# Patient Record
Sex: MALE | Race: WHITE | NOT HISPANIC OR LATINO | URBAN - METROPOLITAN AREA
[De-identification: names, ages, dates, MRNs, and addresses within clinical notes are randomized per-mention and may not be internally consistent; named-entity substitution may affect disease eponyms.]

---

## 2018-07-24 ENCOUNTER — APPOINTMENT (OUTPATIENT)
Dept: URBAN - METROPOLITAN AREA CLINIC 200 | Age: 43
Setting detail: DERMATOLOGY
End: 2018-07-25

## 2018-07-24 DIAGNOSIS — Z12.83 ENCOUNTER FOR SCREENING FOR MALIGNANT NEOPLASM OF SKIN: ICD-10-CM

## 2018-07-24 PROCEDURE — OTHER COUNSELING: OTHER

## 2018-07-24 PROCEDURE — 99213 OFFICE O/P EST LOW 20 MIN: CPT

## 2018-07-24 ASSESSMENT — LOCATION ZONE DERM: LOCATION ZONE: NECK

## 2018-07-24 ASSESSMENT — LOCATION DETAILED DESCRIPTION DERM: LOCATION DETAILED: LEFT INFERIOR ANTERIOR NECK

## 2018-07-24 ASSESSMENT — LOCATION SIMPLE DESCRIPTION DERM: LOCATION SIMPLE: LEFT ANTERIOR NECK

## 2019-06-24 NOTE — TELEPHONE ENCOUNTER
Medicine Refill Request    Last Office Visit: Visit date not found  Next Office Visit: 7/23/2019      No current outpatient prescriptions on file.      BP Readings from Last 3 Encounters:  No data found for BP      Recent Lab results:  No results found for: CHOL, No results found for: HDL, No results found for: LDLCALC, No results found for: TRIG     No results found for: GLUCOSE, No results found for: HGBA1C      No results found for: CREATININE    No results found for: TSH

## 2019-06-25 RX ORDER — SIMVASTATIN 40 MG/1
TABLET, FILM COATED ORAL
Qty: 90 TABLET | Refills: 3 | Status: SHIPPED | OUTPATIENT
Start: 2019-06-25 | End: 2020-08-06

## 2019-07-15 ENCOUNTER — APPOINTMENT (OUTPATIENT)
Dept: URBAN - METROPOLITAN AREA CLINIC 200 | Age: 44
Setting detail: DERMATOLOGY
End: 2019-07-15

## 2019-07-15 DIAGNOSIS — L57.0 ACTINIC KERATOSIS: ICD-10-CM

## 2019-07-15 DIAGNOSIS — Z12.83 ENCOUNTER FOR SCREENING FOR MALIGNANT NEOPLASM OF SKIN: ICD-10-CM

## 2019-07-15 PROCEDURE — 17000 DESTRUCT PREMALG LESION: CPT

## 2019-07-15 PROCEDURE — OTHER LIQUID NITROGEN: OTHER

## 2019-07-15 ASSESSMENT — PAIN INTENSITY VAS: HOW INTENSE IS YOUR PAIN 0 BEING NO PAIN, 10 BEING THE MOST SEVERE PAIN POSSIBLE?: NO PAIN

## 2019-07-15 ASSESSMENT — LOCATION ZONE DERM: LOCATION ZONE: ARM

## 2019-07-15 ASSESSMENT — LOCATION SIMPLE DESCRIPTION DERM: LOCATION SIMPLE: RIGHT FOREARM

## 2019-07-15 ASSESSMENT — LOCATION DETAILED DESCRIPTION DERM: LOCATION DETAILED: RIGHT DISTAL ULNAR DORSAL FOREARM

## 2019-07-15 NOTE — PROCEDURE: LIQUID NITROGEN
Render Post-Care Instructions In Note?: no
Detail Level: Simple
Duration Of Freeze Thaw-Cycle (Seconds): 2
Post-Care Instructions: I reviewed with the patient in detail post-care instructions. Patient is to wear sunprotection, and avoid picking at any of the treated lesions. Pt may apply Vaseline to crusted or scabbing areas.
Consent: The patient's consent was obtained including but not limited to risks of crusting, scabbing, blistering, scarring, darker or lighter pigmentary change, recurrence, incomplete removal and infection.

## 2019-07-23 ENCOUNTER — OFFICE VISIT (OUTPATIENT)
Dept: PRIMARY CARE | Facility: CLINIC | Age: 44
End: 2019-07-23
Payer: COMMERCIAL

## 2019-07-23 VITALS
HEART RATE: 70 BPM | BODY MASS INDEX: 26.34 KG/M2 | SYSTOLIC BLOOD PRESSURE: 120 MMHG | WEIGHT: 205.2 LBS | HEIGHT: 74 IN | DIASTOLIC BLOOD PRESSURE: 80 MMHG

## 2019-07-23 DIAGNOSIS — E78.00 PURE HYPERCHOLESTEROLEMIA: ICD-10-CM

## 2019-07-23 DIAGNOSIS — Z00.00 ROUTINE PHYSICAL EXAMINATION: Primary | ICD-10-CM

## 2019-07-23 PROCEDURE — 99396 PREV VISIT EST AGE 40-64: CPT | Performed by: NURSE PRACTITIONER

## 2019-07-23 ASSESSMENT — ENCOUNTER SYMPTOMS
PSYCHIATRIC NEGATIVE: 1
EYES NEGATIVE: 1
ALLERGIC/IMMUNOLOGIC NEGATIVE: 1
RESPIRATORY NEGATIVE: 1
NEUROLOGICAL NEGATIVE: 1
ENDOCRINE NEGATIVE: 1
GASTROINTESTINAL NEGATIVE: 1
CARDIOVASCULAR NEGATIVE: 1
HEMATOLOGIC/LYMPHATIC NEGATIVE: 1
MUSCULOSKELETAL NEGATIVE: 1
CONSTITUTIONAL NEGATIVE: 1

## 2019-07-23 NOTE — ASSESSMENT & PLAN NOTE
Screening labs ordered at [facility].  Complete after 8/6/19.  UTD with skin survey and routine eye exam.  Encouraged a regular exercise program.

## 2019-07-23 NOTE — PROGRESS NOTES
[facility]  Niecy Nascimento  [address]  [place] [address]  Phone: [phone]  Fax: [phone]      Patient ID: Darren Mercado                              : 1975    Visit Date: 2019    Chief Complaint: Annual Exam         Patient ID: Darren Mercado is a 44 y.o. male.    Patient Active Problem List   Diagnosis   • Pure hypercholesterolemia   • Routine physical examination         Current Outpatient Prescriptions:   •  simvastatin (ZOCOR) 40 mg tablet, TAKE 1 TABLET BY MOUTH EVERY DAY, Disp: 90 tablet, Rfl: 3    No Known Allergies    Social History     Social History   • Marital status:      Spouse name: N/A   • Number of children: N/A   • Years of education: N/A     Occupational History   • Not on file.     Social History Main Topics   • Smoking status: Never Smoker   • Smokeless tobacco: Never Used   • Alcohol use Not on file   • Drug use: Unknown   • Sexual activity: Not on file     Other Topics Concern   • Not on file     Social History Narrative   • No narrative on file       Health Maintenance   Topic Date Due   • Varicella Vaccines (1 of 2 - 13+ 2-dose series) 1989   • DTaP, Tdap, and Td Vaccines (1 - Tdap) 1995   • Influenza Vaccine (1) 2019   • Meningococcal ACWY  Aged Out   • HIB Vaccines  Aged Out   • IPV Vaccines  Aged Out   • HPV Vaccines  Aged Out   • Pneumococcal  Aged Out     Family History   Problem Relation Age of Onset   • Adopted: Yes     Past Surgical History:   Procedure Laterality Date   • EYE SURGERY      lazy eye       HPI  Routine PE.        The following have been reviewed and updated as appropriate in this visit:  Allergies  Meds  Problems         Review of System  Review of Systems   Constitutional: Negative.    HENT: Negative.    Eyes: Negative.    Respiratory: Negative.    Cardiovascular: Negative.    Gastrointestinal: Negative.    Endocrine: Negative.    Genitourinary: Negative.   "  Musculoskeletal: Negative.    Skin: Negative.    Allergic/Immunologic: Negative.    Neurological: Negative.    Hematological: Negative.    Psychiatric/Behavioral: Negative.        Objective     Vitals  Vitals:    07/23/19 0722   BP: 120/80   BP Location: Left upper arm   Patient Position: Sitting   Pulse: 70   Weight: 93.1 kg (205 lb 3.2 oz)   Height: 1.88 m (6' 2\")     Body mass index is 26.35 kg/m².    Physical Exam  Physical Exam   Constitutional: He is oriented to person, place, and time. He appears well-developed and well-nourished. No distress.   HENT:   Head: Normocephalic.   Right Ear: Tympanic membrane, external ear and ear canal normal.   Left Ear: Tympanic membrane, external ear and ear canal normal.   Nose: Nose normal.   Mouth/Throat: Oropharynx is clear and moist and mucous membranes are normal.   Eyes: Pupils are equal, round, and reactive to light. Conjunctivae and EOM are normal. Right eye exhibits no discharge. Left eye exhibits no discharge.   Neck: Neck supple. No JVD present. No thyromegaly present.   Cardiovascular: Normal rate, regular rhythm, normal heart sounds and intact distal pulses.  Exam reveals no gallop and no friction rub.    No murmur heard.  Pulmonary/Chest: Effort normal and breath sounds normal. No respiratory distress. He has no wheezes. He has no rales.   Abdominal: Soft. Bowel sounds are normal. He exhibits no distension and no mass. There is no hepatosplenomegaly. There is no tenderness. There is no rebound, no guarding and no CVA tenderness.   Musculoskeletal: Normal range of motion. He exhibits no edema, tenderness or deformity.   Lymphadenopathy:     He has no cervical adenopathy.   Neurological: He is alert and oriented to person, place, and time. He displays normal reflexes. No cranial nerve deficit or sensory deficit.   Skin: Skin is warm and dry. No rash noted. He is not diaphoretic. No erythema. No pallor.   Vitals reviewed.      Assessment/Plan     Problem List " Items Addressed This Visit     Pure hypercholesterolemia     On statin. Tolerating well.  Labs ordered for August.         Routine physical examination - Primary     Screening labs ordered at [facility].  Complete after 8/6/19.  UTD with skin survey and routine eye exam.  Encouraged a regular exercise program.         Relevant Orders    CBC and Differential    Comprehensive metabolic panel    Lipid panel    Urinalysis with Reflex Culture              JEANNETTE Ayala  7/23/2019

## 2019-09-22 LAB
ALBUMIN SERPL-MCNC: 4.7 G/DL (ref 3.6–5.1)
ALBUMIN/GLOB SERPL: 2.2 (CALC) (ref 1–2.5)
ALP SERPL-CCNC: 43 U/L (ref 40–115)
ALT SERPL-CCNC: 28 U/L (ref 9–46)
APPEARANCE UR: CLEAR
AST SERPL-CCNC: 19 U/L (ref 10–40)
BACTERIA #/AREA URNS HPF: NORMAL /HPF
BACTERIA UR CULT: NORMAL
BASOPHILS # BLD AUTO: 41 CELLS/UL (ref 0–200)
BASOPHILS NFR BLD AUTO: 0.8 %
BILIRUB SERPL-MCNC: 0.6 MG/DL (ref 0.2–1.2)
BILIRUB UR QL STRIP: NEGATIVE
BUN SERPL-MCNC: 15 MG/DL (ref 7–25)
BUN/CREAT SERPL: NORMAL (CALC) (ref 6–22)
CALCIUM SERPL-MCNC: 10 MG/DL (ref 8.6–10.3)
CHLORIDE SERPL-SCNC: 103 MMOL/L (ref 98–110)
CHOLEST SERPL-MCNC: 227 MG/DL
CHOLEST/HDLC SERPL: 4.6 (CALC)
CO2 SERPL-SCNC: 30 MMOL/L (ref 20–32)
COLOR UR: YELLOW
CREAT SERPL-MCNC: 0.93 MG/DL (ref 0.6–1.35)
EOSINOPHIL # BLD AUTO: 260 CELLS/UL (ref 15–500)
EOSINOPHIL NFR BLD AUTO: 5.1 %
ERYTHROCYTE [DISTWIDTH] IN BLOOD BY AUTOMATED COUNT: 12.8 % (ref 11–15)
GLOBULIN SER CALC-MCNC: 2.1 G/DL (CALC) (ref 1.9–3.7)
GLUCOSE SERPL-MCNC: 92 MG/DL (ref 65–99)
GLUCOSE UR QL STRIP: NEGATIVE
HCT VFR BLD AUTO: 46.8 % (ref 38.5–50)
HDLC SERPL-MCNC: 49 MG/DL
HGB BLD-MCNC: 15.5 G/DL (ref 13.2–17.1)
HGB UR QL STRIP: NEGATIVE
HYALINE CASTS #/AREA URNS LPF: NORMAL /LPF
KETONES UR QL STRIP: NEGATIVE
LDLC SERPL CALC-MCNC: 152 MG/DL (CALC)
LEUKOCYTE ESTERASE UR QL STRIP: NEGATIVE
LYMPHOCYTES # BLD AUTO: 1443 CELLS/UL (ref 850–3900)
LYMPHOCYTES NFR BLD AUTO: 28.3 %
MCH RBC QN AUTO: 28.5 PG (ref 27–33)
MCHC RBC AUTO-ENTMCNC: 33.1 G/DL (ref 32–36)
MCV RBC AUTO: 86 FL (ref 80–100)
MONOCYTES # BLD AUTO: 439 CELLS/UL (ref 200–950)
MONOCYTES NFR BLD AUTO: 8.6 %
NEUTROPHILS # BLD AUTO: 2917 CELLS/UL (ref 1500–7800)
NEUTROPHILS NFR BLD AUTO: 57.2 %
NITRITE UR QL STRIP: NEGATIVE
NONHDLC SERPL-MCNC: 178 MG/DL (CALC)
PH UR STRIP: 6 [PH] (ref 5–8)
PLATELET # BLD AUTO: 262 THOUSAND/UL (ref 140–400)
PMV BLD REES-ECKER: 9.6 FL (ref 7.5–12.5)
POTASSIUM SERPL-SCNC: 4.5 MMOL/L (ref 3.5–5.3)
PROT SERPL-MCNC: 6.8 G/DL (ref 6.1–8.1)
PROT UR QL STRIP: NEGATIVE
QUEST EGFR NON-AFR. AMERICAN: 100 ML/MIN/1.73M2
RBC # BLD AUTO: 5.44 MILLION/UL (ref 4.2–5.8)
RBC #/AREA URNS HPF: NORMAL /HPF
SODIUM SERPL-SCNC: 140 MMOL/L (ref 135–146)
SP GR UR STRIP: 1.02 (ref 1–1.03)
SQUAMOUS #/AREA URNS HPF: NORMAL /HPF
TRIGL SERPL-MCNC: 134 MG/DL
WBC # BLD AUTO: 5.1 THOUSAND/UL (ref 3.8–10.8)
WBC #/AREA URNS HPF: NORMAL /HPF

## 2020-07-20 ENCOUNTER — APPOINTMENT (OUTPATIENT)
Dept: URBAN - METROPOLITAN AREA CLINIC 200 | Age: 45
Setting detail: DERMATOLOGY
End: 2020-07-20

## 2020-07-20 DIAGNOSIS — L82.1 OTHER SEBORRHEIC KERATOSIS: ICD-10-CM

## 2020-07-20 DIAGNOSIS — L57.8 OTHER SKIN CHANGES DUE TO CHRONIC EXPOSURE TO NONIONIZING RADIATION: ICD-10-CM

## 2020-07-20 DIAGNOSIS — Z12.83 ENCOUNTER FOR SCREENING FOR MALIGNANT NEOPLASM OF SKIN: ICD-10-CM

## 2020-07-20 PROCEDURE — OTHER REASSURANCE: OTHER

## 2020-07-20 PROCEDURE — 99213 OFFICE O/P EST LOW 20 MIN: CPT

## 2020-07-20 PROCEDURE — OTHER COUNSELING: OTHER

## 2020-07-20 ASSESSMENT — LOCATION ZONE DERM
LOCATION ZONE: TRUNK
LOCATION ZONE: LEG

## 2020-07-20 ASSESSMENT — LOCATION DETAILED DESCRIPTION DERM
LOCATION DETAILED: LEFT MEDIAL SUPERIOR CHEST
LOCATION DETAILED: RIGHT POPLITEAL SKIN
LOCATION DETAILED: RIGHT SUPERIOR MEDIAL UPPER BACK
LOCATION DETAILED: LEFT INFERIOR LATERAL MIDBACK

## 2020-07-20 ASSESSMENT — LOCATION SIMPLE DESCRIPTION DERM
LOCATION SIMPLE: CHEST
LOCATION SIMPLE: RIGHT UPPER BACK
LOCATION SIMPLE: RIGHT POPLITEAL SKIN
LOCATION SIMPLE: LEFT LOWER BACK

## 2020-08-03 ENCOUNTER — OFFICE VISIT (OUTPATIENT)
Dept: PRIMARY CARE | Facility: CLINIC | Age: 45
End: 2020-08-03
Payer: COMMERCIAL

## 2020-08-03 VITALS
HEART RATE: 78 BPM | HEIGHT: 74 IN | OXYGEN SATURATION: 98 % | BODY MASS INDEX: 23.87 KG/M2 | SYSTOLIC BLOOD PRESSURE: 130 MMHG | DIASTOLIC BLOOD PRESSURE: 80 MMHG | WEIGHT: 186 LBS | TEMPERATURE: 98.6 F

## 2020-08-03 DIAGNOSIS — E78.00 PURE HYPERCHOLESTEROLEMIA: ICD-10-CM

## 2020-08-03 DIAGNOSIS — Z00.00 ENCOUNTER FOR ANNUAL PHYSICAL EXAM: ICD-10-CM

## 2020-08-03 DIAGNOSIS — Z00.00 PREVENTATIVE HEALTH CARE: Primary | ICD-10-CM

## 2020-08-03 PROCEDURE — 99396 PREV VISIT EST AGE 40-64: CPT | Performed by: INTERNAL MEDICINE

## 2020-08-03 SDOH — HEALTH STABILITY: MENTAL HEALTH: HOW OFTEN DO YOU HAVE A DRINK CONTAINING ALCOHOL?: 2-3 TIMES A WEEK

## 2020-08-03 ASSESSMENT — ENCOUNTER SYMPTOMS
ENDOCRINE NEGATIVE: 1
MUSCULOSKELETAL NEGATIVE: 1
CARDIOVASCULAR NEGATIVE: 1
NEUROLOGICAL NEGATIVE: 1
DYSURIA: 0
FLANK PAIN: 0
ALLERGIC/IMMUNOLOGIC NEGATIVE: 1
PSYCHIATRIC NEGATIVE: 1
GASTROINTESTINAL NEGATIVE: 1
HEMATURIA: 0
HEMATOLOGIC/LYMPHATIC NEGATIVE: 1
RESPIRATORY NEGATIVE: 1
DIFFICULTY URINATING: 0

## 2020-08-03 NOTE — PROGRESS NOTES
[facility]  Dr. Modesta Ashby  [address]  [place] [address]  Phone: [phone]  Fax: [phone]      Patient ID: Darren Mercado                              : 1975    Visit Date: 2020    Chief Complaint: Annual Exam      HPI  Here for annual physical  Reports his wife makes certain that he does this      No current outpatient medications on file.     No current facility-administered medications for this visit.        No Known Allergies    Past Surgical History:   Procedure Laterality Date   • EYE SURGERY Right     lazy eye       History reviewed. No pertinent past medical history.    Health Maintenance   Topic Date Due   • Varicella Vaccines (1 of 2 - 2-dose childhood series) 1977   • HIV Screening  1989   • Influenza Vaccine (1) 2020   • DTaP, Tdap, and Td Vaccines (2 - Td) 2028   • Meningococcal ACWY  Aged Out   • HIB Vaccines  Aged Out   • IPV Vaccines  Aged Out   • HPV Vaccines  Aged Out   • Pneumococcal  Aged Out       Social History     Tobacco Use   • Smoking status: Never Smoker   • Smokeless tobacco: Never Used   Substance Use Topics   • Alcohol use: Yes     Alcohol/week: 5.0 standard drinks     Types: 5 Standard drinks or equivalent per week     Frequency: 2-3 times a week   • Drug use: Never       Family History   Adopted: Yes   Family history unknown: Yes       The following have been reviewed and updated as appropriate in this visit:  Allergies  Meds  Problems         Review of Systems  Review of Systems   Constitutional: Positive for unexpected weight change (related to the pandemic weight loss - stabilized a month ago).   HENT: Negative.         Dental exam 2 weeks ago and done twice a year   Eyes: Positive for visual disturbance (decreased vision right due to lazy eye). Negative for photophobia, pain, discharge, redness and itching.        Eye doc visits annually - last 2019 and has one  "scheduled next month   Respiratory: Negative.    Cardiovascular: Negative.    Gastrointestinal: Negative.    Endocrine: Negative.    Genitourinary: Negative for difficulty urinating, discharge, dysuria, flank pain, genital sores, hematuria, penile pain, scrotal swelling and testicular pain.        Advised to do testicular exams every 2 - 3 months and report any lumps/changes noted   Musculoskeletal: Negative.         Dermatologist evaluation 2 weeks ago   Skin: Negative.         Dermatologist visit 2 weeks ago - annual visit   Allergic/Immunologic: Negative.    Neurological: Negative.    Hematological: Negative.    Psychiatric/Behavioral: Negative.         Vitals:    08/03/20 0743   BP: 130/80   BP Location: Left upper arm   Patient Position: Sitting   Pulse: 78   Temp: 37 °C (98.6 °F)   SpO2: 98%   Weight: 84.4 kg (186 lb)   Height: 1.88 m (6' 2\")       Body mass index is 23.88 kg/m².    Physical Exam  Physical Exam   Constitutional: He is oriented to person, place, and time. He appears well-developed and well-nourished.   HENT:   Head: Normocephalic.   Right Ear: Hearing, tympanic membrane, external ear and ear canal normal.   Left Ear: Hearing, tympanic membrane, external ear and ear canal normal.   Nose: Nose normal.   Not examined as patient without related complaints and had a normal dental exam 2 weeks ago   Eyes: Pupils are equal, round, and reactive to light. Conjunctivae, EOM and lids are normal.   Neck: Trachea normal, normal range of motion and phonation normal. Neck supple. Carotid bruit is not present. No thyroid mass and no thyromegaly present.   Cardiovascular: Normal rate, regular rhythm, S1 normal and S2 normal.  No extrasystoles are present. Exam reveals no gallop.   No murmur heard.  Pulmonary/Chest: Effort normal and breath sounds normal.   Abdominal: Soft. Normal appearance and bowel sounds are normal. He exhibits no mass. There is no hepatosplenomegaly. There is no tenderness. There is no CVA " tenderness.   Musculoskeletal: Normal range of motion. He exhibits no edema, tenderness or deformity.   Lymphadenopathy:        Head (right side): No submandibular adenopathy present.        Head (left side): No submandibular adenopathy present.     He has no cervical adenopathy.        Right: No supraclavicular adenopathy present.        Left: No supraclavicular adenopathy present.   Neurological: He is alert and oriented to person, place, and time. He has normal strength. No cranial nerve deficit. Gait normal.   Skin: Skin is warm, dry and intact. No rash noted. No pallor.   Psychiatric: He has a normal mood and affect. His speech is normal and behavior is normal. Judgment and thought content normal. Cognition and memory are normal.   Vitals reviewed.      Assessment/Plan     Problem List Items Addressed This Visit        Endocrine/Metabolic    Pure hypercholesterolemia    Current Assessment & Plan     calculated ASCVD risk < 7.5  pt stopped statin on his own  recheck labs and consider calcium scoring  adhere to low saturated fat and white carb diet            Other    Preventative health care - Primary    Relevant Orders    CBC and Differential    Comprehensive metabolic panel    Lipid panel    Urinalysis with Reflex Culture (ED and Outpatient only)        Flu shot in the fall  Return in about 1 year (around 8/3/2021).          Modesta Ashby MD  8/6/2020

## 2020-08-06 ASSESSMENT — ENCOUNTER SYMPTOMS
UNEXPECTED WEIGHT CHANGE: 1
EYE PAIN: 0
PHOTOPHOBIA: 0
EYE ITCHING: 0
EYE REDNESS: 0
EYE DISCHARGE: 0

## 2020-08-06 NOTE — ASSESSMENT & PLAN NOTE
calculated ASCVD risk < 7.5  pt stopped statin on his own  recheck labs and consider calcium scoring  adhere to low saturated fat and white carb diet

## 2020-08-25 LAB
ALBUMIN SERPL-MCNC: 4.5 G/DL (ref 3.6–5.1)
ALBUMIN/GLOB SERPL: 2.3 (CALC) (ref 1–2.5)
ALP SERPL-CCNC: 42 U/L (ref 36–130)
ALT SERPL-CCNC: 14 U/L (ref 9–46)
APPEARANCE UR: CLEAR
AST SERPL-CCNC: 15 U/L (ref 10–40)
BACTERIA #/AREA URNS HPF: NORMAL /HPF
BACTERIA UR CULT: NORMAL
BASOPHILS # BLD AUTO: 22 CELLS/UL (ref 0–200)
BASOPHILS NFR BLD AUTO: 0.5 %
BILIRUB SERPL-MCNC: 0.6 MG/DL (ref 0.2–1.2)
BILIRUB UR QL STRIP: NEGATIVE
BUN SERPL-MCNC: 14 MG/DL (ref 7–25)
BUN/CREAT SERPL: ABNORMAL (CALC) (ref 6–22)
CALCIUM SERPL-MCNC: 9.6 MG/DL (ref 8.6–10.3)
CHLORIDE SERPL-SCNC: 102 MMOL/L (ref 98–110)
CHOLEST SERPL-MCNC: 273 MG/DL
CHOLEST/HDLC SERPL: 5.8 (CALC)
CO2 SERPL-SCNC: 29 MMOL/L (ref 20–32)
COLOR UR: YELLOW
CREAT SERPL-MCNC: 0.86 MG/DL (ref 0.6–1.35)
EOSINOPHIL # BLD AUTO: 158 CELLS/UL (ref 15–500)
EOSINOPHIL NFR BLD AUTO: 3.6 %
ERYTHROCYTE [DISTWIDTH] IN BLOOD BY AUTOMATED COUNT: 12.5 % (ref 11–15)
GLOBULIN SER CALC-MCNC: 2 G/DL (CALC) (ref 1.9–3.7)
GLUCOSE SERPL-MCNC: 103 MG/DL (ref 65–99)
GLUCOSE UR QL STRIP: NEGATIVE
HCT VFR BLD AUTO: 43.5 % (ref 38.5–50)
HDLC SERPL-MCNC: 47 MG/DL
HGB BLD-MCNC: 14.9 G/DL (ref 13.2–17.1)
HGB UR QL STRIP: NEGATIVE
HYALINE CASTS #/AREA URNS LPF: NORMAL /LPF
KETONES UR QL STRIP: NEGATIVE
LDLC SERPL CALC-MCNC: 205 MG/DL (CALC)
LEUKOCYTE ESTERASE UR QL STRIP: NEGATIVE
LYMPHOCYTES # BLD AUTO: 1632 CELLS/UL (ref 850–3900)
LYMPHOCYTES NFR BLD AUTO: 37.1 %
MCH RBC QN AUTO: 29.3 PG (ref 27–33)
MCHC RBC AUTO-ENTMCNC: 34.3 G/DL (ref 32–36)
MCV RBC AUTO: 85.5 FL (ref 80–100)
MONOCYTES # BLD AUTO: 378 CELLS/UL (ref 200–950)
MONOCYTES NFR BLD AUTO: 8.6 %
NEUTROPHILS # BLD AUTO: 2209 CELLS/UL (ref 1500–7800)
NEUTROPHILS NFR BLD AUTO: 50.2 %
NITRITE UR QL STRIP: NEGATIVE
NONHDLC SERPL-MCNC: 226 MG/DL (CALC)
PH UR STRIP: 6 [PH] (ref 5–8)
PLATELET # BLD AUTO: 275 THOUSAND/UL (ref 140–400)
PMV BLD REES-ECKER: 10.3 FL (ref 7.5–12.5)
POTASSIUM SERPL-SCNC: 4.1 MMOL/L (ref 3.5–5.3)
PROT SERPL-MCNC: 6.5 G/DL (ref 6.1–8.1)
PROT UR QL STRIP: NEGATIVE
QUEST EGFR NON-AFR. AMERICAN: 105 ML/MIN/1.73M2
RBC # BLD AUTO: 5.09 MILLION/UL (ref 4.2–5.8)
RBC #/AREA URNS HPF: NORMAL /HPF
SODIUM SERPL-SCNC: 139 MMOL/L (ref 135–146)
SP GR UR STRIP: 1.02 (ref 1–1.03)
SQUAMOUS #/AREA URNS HPF: NORMAL /HPF
TRIGL SERPL-MCNC: 92 MG/DL
WBC # BLD AUTO: 4.4 THOUSAND/UL (ref 3.8–10.8)
WBC #/AREA URNS HPF: NORMAL /HPF

## 2020-09-03 ENCOUNTER — TELEMEDICINE (OUTPATIENT)
Dept: PRIMARY CARE | Facility: CLINIC | Age: 45
End: 2020-09-03
Payer: COMMERCIAL

## 2020-09-03 DIAGNOSIS — E78.00 PURE HYPERCHOLESTEROLEMIA: Primary | ICD-10-CM

## 2020-09-03 DIAGNOSIS — R73.01 IMPAIRED FASTING GLUCOSE: ICD-10-CM

## 2020-09-03 PROCEDURE — 99442 PR PHYS/QHP TELEPHONE EVALUATION 11-20 MIN: CPT | Performed by: INTERNAL MEDICINE

## 2020-09-03 NOTE — PROGRESS NOTES
Verification of Patient Location:  The patient affirms they are currently located in the following state: [place]    Request for Consent:    Audio Only Encounter   You and I are about to have a telemedicine check-in or visit. This is allowed because you have requested it. This telemedicine visit will be billed to your health insurance or you, if you are self-insured. You understand you will be responsible for any copayments or coinsurances that apply to your telemedicine visit. Before starting our telemedicine visit, I am required to get your consent for this virtual check-in or visit by telemedicine. Do you consent?    Patient Response to Request for Consent:  Yes      Visit Documentation:  Patient Active Problem List   Diagnosis   • Pure hypercholesterolemia   • Encounter for annual physical exam   • Impaired fasting glucose     No current outpatient medications on file.    No Known Allergies    Subjective     Patient ID: Darren Mercado is a 45 y.o. male.  1975      Telemedicine visit scheduled to review labs done last week  Patient advised CBC normal, CMP normal except for FBS of 103 and LDL cholesterol 205 - significantly higher than last year's    He states that he did eat a generous dessert the night before the labs were done  Advised that that would affect the FBS but the cholesterol elevation such that it is would not elevate after just one meal  He has not changed his diet markedly that is is aware of      The following have been reviewed and updated as appropriate in this visit:  Allergies  Meds  Problems       Review of Systems      Assessment/Plan   Diagnoses and all orders for this visit:    Pure hypercholesterolemia (Primary)  Assessment & Plan:  Advised patient that his current cholesterol is very high and that anyone with an LDL cholesterol persisting above 190 should be on a statin  Recommend that he adhere to a low saturated fat diet  Offered to supply him with information - he  declined  Online option is to check out low fat diets in the [facility] web sites  Repeat lipid panel in 4 - 6 months       Orders:  -     Comprehensive metabolic panel; Future  -     Lipid panel; Future    Impaired fasting glucose  Assessment & Plan:  Advised to limit sweets and carbs such as breads, pasta, white rice and white potatoes  Keep weight in ideal area  Exercise regularly      Return in about 6 months (around 3/3/2021).    Time Spent in Medical Discussion During This Encounter:    11 minutes

## 2020-09-05 PROBLEM — R73.01 IMPAIRED FASTING GLUCOSE: Status: ACTIVE | Noted: 2020-09-05

## 2020-09-06 NOTE — ASSESSMENT & PLAN NOTE
Advised to limit sweets and carbs such as breads, pasta, white rice and white potatoes  Keep weight in ideal area  Exercise regularly

## 2020-09-06 NOTE — ASSESSMENT & PLAN NOTE
Advised patient that his current cholesterol is very high and that anyone with an LDL cholesterol persisting above 190 should be on a statin  Recommend that he adhere to a low saturated fat diet  Offered to supply him with information - he declined  Online option is to check out low fat diets in the [facility] web sites  Repeat lipid panel in 4 - 6 months

## 2021-02-23 LAB
ALBUMIN SERPL-MCNC: 4.5 G/DL (ref 3.6–5.1)
ALBUMIN/GLOB SERPL: 2.3 (CALC) (ref 1–2.5)
ALP SERPL-CCNC: 43 U/L (ref 36–130)
ALT SERPL-CCNC: 16 U/L (ref 9–46)
AST SERPL-CCNC: 19 U/L (ref 10–40)
BILIRUB SERPL-MCNC: 0.8 MG/DL (ref 0.2–1.2)
BUN SERPL-MCNC: 15 MG/DL (ref 7–25)
BUN/CREAT SERPL: NORMAL (CALC) (ref 6–22)
CALCIUM SERPL-MCNC: 9.3 MG/DL (ref 8.6–10.3)
CHLORIDE SERPL-SCNC: 102 MMOL/L (ref 98–110)
CHOLEST SERPL-MCNC: 232 MG/DL
CHOLEST/HDLC SERPL: 5.3 (CALC)
CO2 SERPL-SCNC: 29 MMOL/L (ref 20–32)
CREAT SERPL-MCNC: 0.78 MG/DL (ref 0.6–1.35)
GLOBULIN SER CALC-MCNC: 2 G/DL (CALC) (ref 1.9–3.7)
GLUCOSE SERPL-MCNC: 97 MG/DL (ref 65–99)
HDLC SERPL-MCNC: 44 MG/DL
LDLC SERPL CALC-MCNC: 168 MG/DL (CALC)
NONHDLC SERPL-MCNC: 188 MG/DL (CALC)
POTASSIUM SERPL-SCNC: 4 MMOL/L (ref 3.5–5.3)
PROT SERPL-MCNC: 6.5 G/DL (ref 6.1–8.1)
QUEST EGFR NON-AFR. AMERICAN: 110 ML/MIN/1.73M2
SODIUM SERPL-SCNC: 140 MMOL/L (ref 135–146)
TRIGL SERPL-MCNC: 92 MG/DL

## 2021-03-01 ENCOUNTER — OFFICE VISIT (OUTPATIENT)
Dept: PRIMARY CARE | Facility: CLINIC | Age: 46
End: 2021-03-01
Payer: COMMERCIAL

## 2021-03-01 VITALS
HEIGHT: 74 IN | WEIGHT: 178 LBS | TEMPERATURE: 97.3 F | BODY MASS INDEX: 22.84 KG/M2 | SYSTOLIC BLOOD PRESSURE: 122 MMHG | DIASTOLIC BLOOD PRESSURE: 86 MMHG | HEART RATE: 71 BPM

## 2021-03-01 DIAGNOSIS — R73.01 IMPAIRED FASTING GLUCOSE: ICD-10-CM

## 2021-03-01 DIAGNOSIS — E78.00 PURE HYPERCHOLESTEROLEMIA: Primary | ICD-10-CM

## 2021-03-01 PROCEDURE — 99213 OFFICE O/P EST LOW 20 MIN: CPT | Performed by: INTERNAL MEDICINE

## 2021-03-01 NOTE — PROGRESS NOTES
[facility]  Dr. Modesta Ashby  [address]  [place] [address]  Phone: [phone]  Fax: [phone]      Patient ID: Darren Mercado                              : 1975    Visit Date: 3/2/2021    Chief Complaint: Results      Patient ID: Darren Mercado is a 45 y.o. male.    HPI  Here to follow up regarding his lab work    Walking multiple times a day - got a dog which generally moves fairly fast on walks  Not going to restaurants as much - so diet better controlled  Didn't take all fats out of his diet  Lifetime risk of ASCVD calculated today 46%  Current 10 year risk 2.5%  Patient is adopted and does not know his family history   Wife already has him scheduled here for an annual physical in 2021  Reviewed labs done  Hyperlipidemia  This is a new problem. The current episode started more than 1 month ago. The problem is uncontrolled (but okay for current risk). Recent lipid tests were reviewed and are high. He has no history of chronic renal disease, diabetes, hypothyroidism, liver disease, obesity or nephrotic syndrome. Factors aggravating his hyperlipidemia include fatty foods. Pertinent negatives include no chest pain or shortness of breath. Current antihyperlipidemic treatment includes diet change and exercise. The current treatment provides moderate improvement of lipids. There are no compliance problems.  Risk factors for coronary artery disease include male sex and dyslipidemia.         Patient Active Problem List   Diagnosis   • Pure hypercholesterolemia   • Impaired fasting glucose       No current outpatient medications on file.    No Known Allergies    Social History     Tobacco Use   • Smoking status: Never Smoker   • Smokeless tobacco: Never Used   Substance Use Topics   • Alcohol use: Yes     Alcohol/week: 5.0 standard drinks     Types: 5 Standard drinks or equivalent per week     Frequency: 2-3 times a week   • Drug use: Never  "      Health Maintenance   Topic Date Due   • HIV Screening  Never done   • Hepatitis C Screening  Never done   • DTaP, Tdap, and Td Vaccines (2 - Td) 08/26/2028   • Influenza Vaccine  Completed   • Meningococcal ACWY  Aged Out   • HIB Vaccines  Aged Out   • IPV Vaccines  Aged Out   • HPV Vaccines  Aged Out   • Pneumococcal  Aged Out   • Varicella Vaccines  Discontinued       No past medical history on file.    The following have been reviewed and updated as appropriate in this visit:  Allergies  Meds  Problems         Review of System  Review of Systems   Respiratory: Negative for shortness of breath.    Cardiovascular: Negative for chest pain.       Objective     Vitals  Vitals:    03/01/21 0853   BP: 122/86   Pulse: 71   Temp: 36.3 °C (97.3 °F)   Weight: 80.7 kg (178 lb)   Height: 1.88 m (6' 2\")       Body mass index is 22.85 kg/m².    Physical Exam  Physical Exam  Constitutional:       Appearance: Normal appearance.   Neck:      Musculoskeletal: Neck supple.   Cardiovascular:      Rate and Rhythm: Normal rate and regular rhythm.   Pulmonary:      Effort: Pulmonary effort is normal.   Skin:     General: Skin is warm and dry.      Coloration: Skin is not pale.   Neurological:      Mental Status: He is alert.   Psychiatric:         Mood and Affect: Mood normal.         Assessment/Plan     Problem List Items Addressed This Visit        Endocrine/Metabolic    Pure hypercholesterolemia - Primary     Patient without atheroslcerosis related symptoms  Repeat of lipid panel shows considerable improvement with better diet choices and exercise  Encouraged additional diet restrictions and continued exercise  To follow up for annual exam in 5 months  Will order lab work then  Due to patient no knowing his family history, strongly consider calcium scoring in the near future - will consider when pandemic better controlled           Impaired fasting glucose     Decreased weight by #8  Blood sugar improved  Continue to " maintain weight in normal range  Adhere to diet and exercise programs  Follow FBS               Return in about 5 months (around 8/1/2021).      Modesta Ashby MD  3/2/2021

## 2021-03-02 ASSESSMENT — ENCOUNTER SYMPTOMS: SHORTNESS OF BREATH: 0

## 2021-03-02 NOTE — ASSESSMENT & PLAN NOTE
Decreased weight by #8  Blood sugar improved  Continue to maintain weight in normal range  Adhere to diet and exercise programs  Follow FBS

## 2021-03-02 NOTE — ASSESSMENT & PLAN NOTE
Patient without atheroslcerosis related symptoms  Repeat of lipid panel shows considerable improvement with better diet choices and exercise  Encouraged additional diet restrictions and continued exercise  To follow up for annual exam in 5 months  Will order lab work then  Due to patient no knowing his family history, strongly consider calcium scoring in the near future - will consider when pandemic better controlled

## 2021-05-28 ENCOUNTER — TELEMEDICINE (OUTPATIENT)
Dept: PRIMARY CARE | Facility: CLINIC | Age: 46
End: 2021-05-28
Payer: COMMERCIAL

## 2021-05-28 DIAGNOSIS — J30.2 SEASONAL ALLERGIES: Primary | ICD-10-CM

## 2021-05-28 PROCEDURE — 99213 OFFICE O/P EST LOW 20 MIN: CPT | Mod: 95 | Performed by: STUDENT IN AN ORGANIZED HEALTH CARE EDUCATION/TRAINING PROGRAM

## 2021-05-28 RX ORDER — IPRATROPIUM BROMIDE 42 UG/1
2 SPRAY, METERED NASAL 4 TIMES DAILY
Qty: 15 ML | Refills: 1 | Status: SHIPPED | OUTPATIENT
Start: 2021-05-28 | End: 2021-06-21

## 2021-05-28 RX ORDER — ALBUTEROL SULFATE 90 UG/1
2 INHALANT RESPIRATORY (INHALATION) 4 TIMES DAILY PRN
Qty: 18 G | Refills: 1 | Status: SHIPPED | OUTPATIENT
Start: 2021-05-28 | End: 2022-08-10

## 2021-05-28 ASSESSMENT — ENCOUNTER SYMPTOMS
APPETITE CHANGE: 0
SHORTNESS OF BREATH: 0
SINUS PRESSURE: 1
DIAPHORESIS: 0
DIZZINESS: 0
NAUSEA: 0
FATIGUE: 0
COUGH: 1
LIGHT-HEADEDNESS: 0
SORE THROAT: 0
JOINT SWELLING: 0
ABDOMINAL PAIN: 0
FEVER: 0
DIARRHEA: 0
VOMITING: 0
CHILLS: 0
SINUS PAIN: 1
WHEEZING: 0
CONSTIPATION: 0
EYE PAIN: 0
CHEST TIGHTNESS: 0
STRIDOR: 0

## 2021-05-28 NOTE — PROGRESS NOTES
Verification of Patient Location:  The patient affirms they are currently located in the following state: [place]    Request for Consent:    Audio and Video Encounter   Tio, my name is Freda Mcwilliams MD.  Before we proceed, can you please verify your identification by telling me your full name and date of birth?  Can you tell me who is in the room with you?    You and I are about to have a telemedicine check-in or visit because you have requested it.  This is a live video-conference.  I am a real person, speaking to you in real time.  There is no one else with me on the video-conference.  However, when we use ([employer], [employer], etc) it is important for you to know that the video-conference may not be secure or private.  I am not recording this conversation and I am asking you not to record it.  This telemedicine visit will be billed to your health insurance or you, if you are self-insured.  You understand you will be responsible for any copayments or coinsurances that apply to your telemedicine visit.  Communication platform used for this encounter:  [employer]     Before starting our telemedicine visit, I am required to get your consent for this virtual check-in or visit by telemedicine. Do you consent?      Patient Response to Request for Consent:  Yes      Visit Documentation:  Subjective     Patient ID: Darren Mercado is a 46 y.o. male.  1975      HPI     Allergy symptoms  Past 1.5 weeks  Vaccinated for covid with pfizer  Using flonase, claritin for four days  Family has similar symptoms (they are fully vaccinated as well)  Most frustrated symptoms is congestion and ear pressure  Coughing at night is bad  Has seasonal allergies  ROS negative as below    The following have been reviewed and updated as appropriate in this visit:  Allergies  Meds  Problems       Review of Systems   Constitutional: Negative for appetite change, chills, diaphoresis, fatigue and fever.   HENT: Positive for congestion,  ear pain, sinus pressure, sinus pain and sneezing. Negative for sore throat.    Eyes: Negative for pain and visual disturbance.   Respiratory: Positive for cough. Negative for chest tightness, shortness of breath, wheezing and stridor.    Cardiovascular: Negative for chest pain and leg swelling.   Gastrointestinal: Negative for abdominal pain, constipation, diarrhea, nausea and vomiting.   Musculoskeletal: Negative for joint swelling.   Skin: Negative for rash.   Neurological: Negative for dizziness and light-headedness.        Patient Active Problem List   Diagnosis   • Pure hypercholesterolemia   • Impaired fasting glucose   • Seasonal allergies         Current Outpatient Medications:   •  albuterol HFA (VENTOLIN HFA) 90 mcg/actuation inhaler, Inhale 2 puffs 4 (four) times a day as needed for wheezing or shortness of breath., Disp: 18 g, Rfl: 1  •  ipratropium (ATROVENT) 42 mcg (0.06 %) nasal spray, Administer 2 sprays into each nostril 4 (four) times a day., Disp: 15 mL, Rfl: 1    No Known Allergies    Family History   Adopted: Yes   Family history unknown: Yes       Past Surgical History:   Procedure Laterality Date   • EYE SURGERY Right 1983    lazy eye             Assessment/Plan   Diagnoses and all orders for this visit:    Seasonal allergies (Primary)  Assessment & Plan:  Very bad seasonal allergies these past few weeks. May have developed worsening symptoms.  No red flag symptoms which is reassuring.  Only 4 days into take OTC meds, discussed how it can take longer (1-2 weeks) for meds to have full effect.   However, given cough will trial tx with albuterol inhaler.   Congestion also tx with atrovent nasal spray for more immediate relief.   Call back precautions discussed.      Other orders  -     ipratropium (ATROVENT) 42 mcg (0.06 %) nasal spray; Administer 2 sprays into each nostril 4 (four) times a day.  -     albuterol HFA (VENTOLIN HFA) 90 mcg/actuation inhaler; Inhale 2 puffs 4 (four) times a day as  needed for wheezing or shortness of breath.      Time Spent:  I spent 20 minutes on this date of service performing the following activities: obtaining history, entering orders, documenting, preparing for visit, obtaining / reviewing records, providing counseling and education and coordinating care.

## 2021-05-28 NOTE — ASSESSMENT & PLAN NOTE
Very bad seasonal allergies these past few weeks. May have developed worsening symptoms.  No red flag symptoms which is reassuring.  Only 4 days into take OTC meds, discussed how it can take longer (1-2 weeks) for meds to have full effect.   However, given cough will trial tx with albuterol inhaler.   Congestion also tx with atrovent nasal spray for more immediate relief.   Call back precautions discussed.

## 2021-06-21 RX ORDER — IPRATROPIUM BROMIDE 42 UG/1
2 SPRAY, METERED NASAL 4 TIMES DAILY
Qty: 15 ML | Refills: 1 | Status: SHIPPED | OUTPATIENT
Start: 2021-06-21 | End: 2022-08-10

## 2021-06-21 NOTE — TELEPHONE ENCOUNTER
Medicine Refill Request    Last Office Visit: 3/1/2021  Last Telemedicine Visit: 5/28/2021 Freda Mcwilliams MD    Next Office Visit: 8/9/2021  Next Telemedicine Visit: Visit date not found     Pt is requesting Ipratropium 42 mcg nasal spray      Current Outpatient Medications:   •  albuterol HFA (VENTOLIN HFA) 90 mcg/actuation inhaler, Inhale 2 puffs 4 (four) times a day as needed for wheezing or shortness of breath., Disp: 18 g, Rfl: 1  •  ipratropium (ATROVENT) 42 mcg (0.06 %) nasal spray, Administer 2 sprays into each nostril 4 (four) times a day., Disp: 15 mL, Rfl: 1      BP Readings from Last 3 Encounters:   03/01/21 122/86   08/03/20 130/80   07/23/19 120/80       Recent Lab results:  Lab Results   Component Value Date    CHOL 232 (H) 02/22/2021   ,   Lab Results   Component Value Date    HDL 44 02/22/2021   ,   Lab Results   Component Value Date    LDLCALC 168 (H) 02/22/2021   ,   Lab Results   Component Value Date    TRIG 92 02/22/2021        Lab Results   Component Value Date    GLUCOSE 97 02/22/2021   , No results found for: HGBA1C      Lab Results   Component Value Date    CREATININE 0.78 02/22/2021       No results found for: TSH

## 2021-07-20 RX ORDER — IPRATROPIUM BROMIDE 42 UG/1
2 SPRAY, METERED NASAL 4 TIMES DAILY
Qty: 15 ML | Refills: 1 | OUTPATIENT
Start: 2021-07-20 | End: 2022-07-20

## 2021-07-21 ENCOUNTER — APPOINTMENT (OUTPATIENT)
Dept: URBAN - METROPOLITAN AREA CLINIC 200 | Age: 46
Setting detail: DERMATOLOGY
End: 2021-07-26

## 2021-07-21 DIAGNOSIS — L57.8 OTHER SKIN CHANGES DUE TO CHRONIC EXPOSURE TO NONIONIZING RADIATION: ICD-10-CM

## 2021-07-21 DIAGNOSIS — Z12.83 ENCOUNTER FOR SCREENING FOR MALIGNANT NEOPLASM OF SKIN: ICD-10-CM

## 2021-07-21 DIAGNOSIS — Z11.52 ENCOUNTER FOR SCREENING FOR COVID-19: ICD-10-CM

## 2021-07-21 PROBLEM — L70.0 ACNE VULGARIS: Status: ACTIVE | Noted: 2021-07-21

## 2021-07-21 PROCEDURE — 99212 OFFICE O/P EST SF 10 MIN: CPT

## 2021-07-21 PROCEDURE — OTHER COUNSELING: OTHER

## 2021-07-21 PROCEDURE — OTHER SCREENING FOR COVID-19: OTHER

## 2021-07-21 PROCEDURE — OTHER SUNSCREEN RECOMMENDATIONS: OTHER

## 2021-07-21 PROCEDURE — OTHER REASSURANCE: OTHER

## 2021-07-21 ASSESSMENT — LOCATION ZONE DERM: LOCATION ZONE: TRUNK

## 2021-07-21 ASSESSMENT — LOCATION SIMPLE DESCRIPTION DERM
LOCATION SIMPLE: ABDOMEN
LOCATION SIMPLE: RIGHT UPPER BACK

## 2021-07-21 ASSESSMENT — LOCATION DETAILED DESCRIPTION DERM
LOCATION DETAILED: RIGHT SUPERIOR UPPER BACK
LOCATION DETAILED: PERIUMBILICAL SKIN

## 2021-08-09 ENCOUNTER — OFFICE VISIT (OUTPATIENT)
Dept: PRIMARY CARE | Facility: CLINIC | Age: 46
End: 2021-08-09
Payer: COMMERCIAL

## 2021-08-09 VITALS
SYSTOLIC BLOOD PRESSURE: 120 MMHG | WEIGHT: 177 LBS | OXYGEN SATURATION: 98 % | HEIGHT: 74 IN | BODY MASS INDEX: 22.72 KG/M2 | HEART RATE: 72 BPM | DIASTOLIC BLOOD PRESSURE: 80 MMHG | RESPIRATION RATE: 17 BRPM

## 2021-08-09 DIAGNOSIS — R73.01 IMPAIRED FASTING GLUCOSE: ICD-10-CM

## 2021-08-09 DIAGNOSIS — J30.2 SEASONAL ALLERGIES: ICD-10-CM

## 2021-08-09 DIAGNOSIS — H69.92 DYSFUNCTION OF LEFT EUSTACHIAN TUBE: ICD-10-CM

## 2021-08-09 DIAGNOSIS — Z00.00 ENCOUNTER FOR PREVENTATIVE ADULT HEALTH CARE EXAMINATION: ICD-10-CM

## 2021-08-09 DIAGNOSIS — E78.00 PURE HYPERCHOLESTEROLEMIA: Primary | ICD-10-CM

## 2021-08-09 PROCEDURE — 99396 PREV VISIT EST AGE 40-64: CPT | Performed by: INTERNAL MEDICINE

## 2021-08-09 PROCEDURE — 3008F BODY MASS INDEX DOCD: CPT | Performed by: INTERNAL MEDICINE

## 2021-08-09 ASSESSMENT — PATIENT HEALTH QUESTIONNAIRE - PHQ9: SUM OF ALL RESPONSES TO PHQ9 QUESTIONS 1 & 2: 0

## 2021-08-09 ASSESSMENT — ENCOUNTER SYMPTOMS
EYES NEGATIVE: 1
MUSCULOSKELETAL NEGATIVE: 1
CONSTITUTIONAL NEGATIVE: 1
ENDOCRINE NEGATIVE: 1
HEMATOLOGIC/LYMPHATIC NEGATIVE: 1
GASTROINTESTINAL NEGATIVE: 1
NEUROLOGICAL NEGATIVE: 1
PSYCHIATRIC NEGATIVE: 1
RESPIRATORY NEGATIVE: 1
CARDIOVASCULAR NEGATIVE: 1

## 2021-08-09 NOTE — PROGRESS NOTES
[facility]  Dr. Modesta Ashby  [address]  [place] [address]  Phone: [phone]  Fax: [phone]      Patient ID: Darren Mercado                              : 1975    Visit Date: 2021    Chief Complaint: Annual Exam      HPI  Ears crackly but no other ear related symptoms  No trouble breathing and no wheezing  Not using medication now with symptoms greatly improved since visit wit Dr. Mcwilliams      Current Outpatient Medications   Medication Sig Dispense Refill   • albuterol HFA (VENTOLIN HFA) 90 mcg/actuation inhaler Inhale 2 puffs 4 (four) times a day as needed for wheezing or shortness of breath. 18 g 1   • ipratropium (ATROVENT) 42 mcg (0.06 %) nasal spray ADMINISTER 2 SPRAYS INTO EACH NOSTRIL 4 (FOUR) TIMES A DAY. (Patient not taking: Reported on 2021 ) 15 mL 1     No current facility-administered medications for this visit.       No Known Allergies    Past Surgical History:   Procedure Laterality Date   • EYE SURGERY Right 1983    lazy eye       History reviewed. No pertinent past medical history.    Health Maintenance   Topic Date Due   • Influenza Vaccine (1) 2021   • HIV Screening  2022 (Originally 3/21/1989)   • Hepatitis C Screening  2022 (Originally 3/21/1994)   • DTaP, Tdap, and Td Vaccines (2 - Td or Tdap) 2028   • COVID-19 Vaccine  Completed   • Meningococcal ACWY  Aged Out   • HIB Vaccines  Aged Out   • IPV Vaccines  Aged Out   • HPV Vaccines  Aged Out   • Pneumococcal  Aged Out   • Varicella Vaccines  Discontinued       Social History     Tobacco Use   • Smoking status: Never Smoker   • Smokeless tobacco: Never Used   Substance Use Topics   • Alcohol use: Yes     Alcohol/week: 5.0 standard drinks     Types: 5 Standard drinks or equivalent per week   • Drug use: Never       Family History   Adopted: Yes   Family history unknown: Yes       The following have been reviewed and updated as  "appropriate in this visit:  Allergies  Meds  Problems         Review of Systems  Review of Systems   Constitutional: Negative.    HENT: Positive for hearing loss (felt like cotton in ears - May and June and back to normal normal in last couple weeks).         Dental appointment 6/2021 - goes every 6 months for cleaning   Eyes: Negative.         Eye doctor appointment annually - due in September   Respiratory: Negative.    Cardiovascular: Negative.    Gastrointestinal: Negative.    Endocrine: Negative.    Genitourinary: Negative.    Musculoskeletal: Negative.    Skin: Negative.         Seen at dermatologist in July - no abnormal findings and seen annually   Allergic/Immunologic: Positive for environmental allergies (probably this past spring). Negative for food allergies and immunocompromised state.   Neurological: Negative.    Hematological: Negative.    Psychiatric/Behavioral: Negative.         Vitals:    08/09/21 0827   BP: 120/80   Pulse: 72   Resp: 17   SpO2: 98%   Weight: 80.3 kg (177 lb)   Height: 1.88 m (6' 2\")       Body mass index is 22.73 kg/m².    Physical Exam  Physical Exam  Vitals reviewed.   Constitutional:       Appearance: Normal appearance. He is normal weight.   HENT:      Head: Normocephalic.      Salivary Glands: Right salivary gland is not diffusely enlarged or tender. Left salivary gland is not diffusely enlarged or tender.      Right Ear: Hearing, tympanic membrane, ear canal and external ear normal.      Left Ear: Hearing, ear canal and external ear normal. A middle ear effusion is present. Tympanic membrane is not erythematous.      Nose: Nose normal.      Mouth/Throat:      Comments: Deferred with recent normal dental exam  Eyes:      General: Lids are normal. Vision grossly intact. Gaze aligned appropriately.      Extraocular Movements: Extraocular movements intact.      Conjunctiva/sclera: Conjunctivae normal.   Neck:      Thyroid: No thyromegaly or thyroid tenderness.      Vascular: " No carotid bruit.      Trachea: Trachea and phonation normal.   Cardiovascular:      Rate and Rhythm: Normal rate and regular rhythm.      Heart sounds: S1 normal and S2 normal. No murmur heard.     Pulmonary:      Effort: Pulmonary effort is normal.      Breath sounds: Normal breath sounds.   Musculoskeletal:      Cervical back: Normal range of motion and neck supple.      Right lower leg: No edema.      Left lower leg: No edema.   Lymphadenopathy:      Head:      Right side of head: No submandibular adenopathy.      Left side of head: No submandibular adenopathy.      Cervical: No cervical adenopathy.   Neurological:      Mental Status: He is alert.         Assessment/Plan     Problem List Items Addressed This Visit        Nervous    Dysfunction of left eustachian tube    Current Assessment & Plan     Minor symptoms persisting  Likely related to allergies  Patient aware to restart nasal spray if worsening agan            Endocrine/Metabolic    Impaired fasting glucose    Current Assessment & Plan     Continue weight control via exercise and low carb diet  Follow A1c           Relevant Orders    Comprehensive metabolic panel    Pure hypercholesterolemia - Primary    Current Assessment & Plan     Denies any related symptoms  ASCVD risk 2.5 with LDL cholesterol in 160 range   No DM or HTN  Encouraged to adhere to low fat diet and increase exercise level  With unknown FH, consider calcium scoring with risk increased to 5 - 7.5% range  Cardiologist consult with any symptoms         Relevant Orders    Lipid panel       Other    Encounter for preventative adult health care examination    Current Assessment & Plan     Encouraged to get flu shot soon  PSA          Relevant Orders    PSA    Seasonal allergies    Current Assessment & Plan     Improved  Discussed that these may recur next year - should treat at start of symptoms if this is the case         Relevant Orders    CBC and differential          Return in about 1  year (around 8/9/2022).          Modesta Ashby MD  8/12/2021

## 2021-08-12 PROBLEM — Z00.00 ENCOUNTER FOR PREVENTATIVE ADULT HEALTH CARE EXAMINATION: Status: ACTIVE | Noted: 2021-08-12

## 2021-08-12 PROBLEM — H69.92 DYSFUNCTION OF LEFT EUSTACHIAN TUBE: Status: ACTIVE | Noted: 2021-08-12

## 2021-08-12 ASSESSMENT — VISUAL ACUITY: OU: 1

## 2021-08-12 NOTE — ASSESSMENT & PLAN NOTE
Improved  Discussed that these may recur next year - should treat at start of symptoms if this is the case

## 2021-08-12 NOTE — ASSESSMENT & PLAN NOTE
Denies any related symptoms  ASCVD risk 2.5 with LDL cholesterol in 160 range   No DM or HTN  Encouraged to adhere to low fat diet and increase exercise level  With unknown FH, consider calcium scoring with risk increased to 5 - 7.5% range  Cardiologist consult with any symptoms

## 2021-08-12 NOTE — ASSESSMENT & PLAN NOTE
Minor symptoms persisting  Likely related to allergies  Patient aware to restart nasal spray if worsening agan

## 2022-07-22 ENCOUNTER — APPOINTMENT (OUTPATIENT)
Dept: URBAN - METROPOLITAN AREA CLINIC 203 | Age: 47
Setting detail: DERMATOLOGY
End: 2022-07-25

## 2022-07-22 DIAGNOSIS — L21.8 OTHER SEBORRHEIC DERMATITIS: ICD-10-CM

## 2022-07-22 DIAGNOSIS — Z12.83 ENCOUNTER FOR SCREENING FOR MALIGNANT NEOPLASM OF SKIN: ICD-10-CM

## 2022-07-22 DIAGNOSIS — Z11.52 ENCOUNTER FOR SCREENING FOR COVID-19: ICD-10-CM

## 2022-07-22 DIAGNOSIS — L57.8 OTHER SKIN CHANGES DUE TO CHRONIC EXPOSURE TO NONIONIZING RADIATION: ICD-10-CM

## 2022-07-22 PROCEDURE — OTHER COUNSELING: OTHER

## 2022-07-22 PROCEDURE — OTHER SCREENING FOR COVID-19: OTHER

## 2022-07-22 PROCEDURE — OTHER SUNSCREEN RECOMMENDATIONS: OTHER

## 2022-07-22 PROCEDURE — 99214 OFFICE O/P EST MOD 30 MIN: CPT

## 2022-07-22 PROCEDURE — OTHER PRESCRIPTION MEDICATION MANAGEMENT: OTHER

## 2022-07-22 PROCEDURE — OTHER REASSURANCE: OTHER

## 2022-07-22 ASSESSMENT — LOCATION DETAILED DESCRIPTION DERM
LOCATION DETAILED: LEFT POSTERIOR NECK
LOCATION DETAILED: RIGHT SUPERIOR POSTERIOR NECK
LOCATION DETAILED: LEFT MEDIAL INFERIOR CHEST
LOCATION DETAILED: MID POSTERIOR NECK
LOCATION DETAILED: EPIGASTRIC SKIN
LOCATION DETAILED: RIGHT MID-UPPER BACK

## 2022-07-22 ASSESSMENT — LOCATION SIMPLE DESCRIPTION DERM
LOCATION SIMPLE: POSTERIOR NECK
LOCATION SIMPLE: RIGHT UPPER BACK
LOCATION SIMPLE: CHEST
LOCATION SIMPLE: ABDOMEN

## 2022-07-22 ASSESSMENT — LOCATION ZONE DERM
LOCATION ZONE: TRUNK
LOCATION ZONE: NECK

## 2022-07-22 NOTE — PROCEDURE: PRESCRIPTION MEDICATION MANAGEMENT
Samples Given: Head and shoulders and DHS ZINC SHAMPOO
Detail Level: Zone
Render In Strict Bullet Format?: No

## 2022-08-10 ENCOUNTER — OFFICE VISIT (OUTPATIENT)
Dept: PRIMARY CARE | Facility: CLINIC | Age: 47
End: 2022-08-10
Payer: COMMERCIAL

## 2022-08-10 VITALS
SYSTOLIC BLOOD PRESSURE: 140 MMHG | HEIGHT: 74 IN | TEMPERATURE: 98.1 F | BODY MASS INDEX: 24.26 KG/M2 | HEART RATE: 69 BPM | WEIGHT: 189 LBS | RESPIRATION RATE: 16 BRPM | OXYGEN SATURATION: 99 % | DIASTOLIC BLOOD PRESSURE: 70 MMHG

## 2022-08-10 DIAGNOSIS — Z11.59 ENCOUNTER FOR HEPATITIS C SCREENING TEST FOR LOW RISK PATIENT: ICD-10-CM

## 2022-08-10 DIAGNOSIS — Z00.00 ENCOUNTER FOR GENERAL ADULT MEDICAL EXAMINATION WITHOUT ABNORMAL FINDINGS: Primary | ICD-10-CM

## 2022-08-10 DIAGNOSIS — R73.01 IMPAIRED FASTING GLUCOSE: ICD-10-CM

## 2022-08-10 DIAGNOSIS — E78.00 PURE HYPERCHOLESTEROLEMIA: ICD-10-CM

## 2022-08-10 PROCEDURE — 3008F BODY MASS INDEX DOCD: CPT | Performed by: STUDENT IN AN ORGANIZED HEALTH CARE EDUCATION/TRAINING PROGRAM

## 2022-08-10 PROCEDURE — 99396 PREV VISIT EST AGE 40-64: CPT | Performed by: STUDENT IN AN ORGANIZED HEALTH CARE EDUCATION/TRAINING PROGRAM

## 2022-08-10 ASSESSMENT — ENCOUNTER SYMPTOMS
ABDOMINAL PAIN: 0
JOINT SWELLING: 0
APPETITE CHANGE: 0
NERVOUS/ANXIOUS: 0
CHILLS: 0
DYSPHORIC MOOD: 0
DIAPHORESIS: 0
COUGH: 0
FATIGUE: 0
DIARRHEA: 0
SINUS PAIN: 0
SHORTNESS OF BREATH: 0
SINUS PRESSURE: 0
VOMITING: 0
NAUSEA: 0
SORE THROAT: 0
LIGHT-HEADEDNESS: 0
DIZZINESS: 0
FEVER: 0
EYE PAIN: 0
CONSTIPATION: 0

## 2022-08-10 ASSESSMENT — PAIN SCALES - GENERAL: PAINLEVEL: 0-NO PAIN

## 2022-08-10 ASSESSMENT — PATIENT HEALTH QUESTIONNAIRE - PHQ9: SUM OF ALL RESPONSES TO PHQ9 QUESTIONS 1 & 2: 0

## 2022-08-10 NOTE — PROGRESS NOTES
Subjective      Patient ID: Darren Mercado is a 47 y.o. male.  1975    HPI    Diet - oatmeal for breakfast; lunch varies (gets free food at work), if at home, eats yogurt, sandwich; protein, vegetables    Exercise - yard work,     Mood -  Doing well     Living Situation - lives with wife, and cats, and dog     Dentist - UTD    Eye Check - UTD    Smoking - No  EtOH - Yes  Illicit Drug Use - No  Sexual Activity - Yes  STD screen - discussed, declined    Colonoscopy (45+): due , not interested   DM screen: due   HLD screen: due   Tdap: UTD  Hep C screen (18-78yo): due   Covid vaccination (6yo+): UTD  Covid booster (12+yo):UTD      Review of Systems   Constitutional: Negative for appetite change, chills, diaphoresis, fatigue and fever.   HENT: Negative for congestion, sinus pressure, sinus pain, sneezing and sore throat.    Eyes: Negative for pain and visual disturbance.   Respiratory: Negative for cough and shortness of breath.    Cardiovascular: Negative for chest pain and leg swelling.   Gastrointestinal: Negative for abdominal pain, constipation, diarrhea, nausea and vomiting.   Musculoskeletal: Negative for joint swelling.   Skin: Negative for rash.   Neurological: Negative for dizziness and light-headedness.   Psychiatric/Behavioral: Negative for dysphoric mood. The patient is not nervous/anxious.        The following have been reviewed and updated as appropriate in this visit:    Patient Active Problem List   Diagnosis   • Pure hypercholesterolemia   • Impaired fasting glucose   • Seasonal allergies   • Encounter for general adult medical examination without abnormal findings   • Dysfunction of left eustachian tube       No current outpatient medications on file.    No Known Allergies    Family History   Adopted: Yes   Family history unknown: Yes       Past Surgical History:   Procedure Laterality Date   • EYE SURGERY Right 1983    lazy eye       Objective     Vitals:    08/10/22 0806   BP: 140/70   BP  "Location: Left upper arm   Patient Position: Sitting   Pulse: 69   Resp: 16   Temp: 36.7 °C (98.1 °F)   TempSrc: Oral   SpO2: 99%   Weight: 85.7 kg (189 lb)   Height: 1.88 m (6' 2\")     Body mass index is 24.27 kg/m².    Physical Exam  Vitals reviewed.   Constitutional:       General: He is not in acute distress.     Appearance: Normal appearance. He is well-developed. He is not ill-appearing, toxic-appearing or diaphoretic.   HENT:      Head: Normocephalic and atraumatic.      Right Ear: Tympanic membrane, ear canal and external ear normal. There is no impacted cerumen.      Left Ear: Tympanic membrane, ear canal and external ear normal. There is no impacted cerumen.   Eyes:      General: No scleral icterus.        Right eye: No discharge.         Left eye: No discharge.      Extraocular Movements: Extraocular movements intact.      Conjunctiva/sclera: Conjunctivae normal.   Neck:      Thyroid: No thyromegaly.   Cardiovascular:      Rate and Rhythm: Normal rate and regular rhythm.      Heart sounds: Normal heart sounds. No murmur heard.    No friction rub. No gallop.   Pulmonary:      Effort: Pulmonary effort is normal. No respiratory distress.      Breath sounds: Normal breath sounds. No stridor. No wheezing, rhonchi or rales.   Chest:      Chest wall: No tenderness.   Abdominal:      General: Bowel sounds are normal. There is no distension.      Palpations: Abdomen is soft. There is no mass.      Tenderness: There is no abdominal tenderness. There is no guarding or rebound.      Hernia: No hernia is present.   Musculoskeletal:         General: Normal range of motion.      Cervical back: Normal range of motion and neck supple.      Right lower leg: No edema.      Left lower leg: No edema.   Skin:     General: Skin is warm.      Capillary Refill: Capillary refill takes less than 2 seconds.   Neurological:      General: No focal deficit present.      Mental Status: He is alert and oriented to person, place, and " time. Mental status is at baseline.   Psychiatric:         Mood and Affect: Mood normal.         Behavior: Behavior normal.         Thought Content: Thought content normal.         Judgment: Judgment normal.         Assessment/Plan   Diagnoses and all orders for this visit:    Encounter for general adult medical examination without abnormal findings (Primary)  Assessment & Plan:  Discussed diet and physical activity practices to promote healthy lifestyle.  Discussed mental health wellness.  Discussed dental, eye health.  High risk behaviors:None  Immunizations: up to date  Cancer screening: declined colonoscopy for now  Labs: ordered as below           Pure hypercholesterolemia  -     Lipid panel; Future    Impaired fasting glucose  -     Hemoglobin A1c; Future  -     Comprehensive metabolic panel; Future    Encounter for hepatitis C screening test for low risk patient  -     Hepatitis C antibody; Future           Freda Mcwilliams M.D.

## 2022-08-10 NOTE — PATIENT INSTRUCTIONS
We talked about the use of coronary calcium scores. I would want to see what your cholesterol is looking like first.

## 2022-08-10 NOTE — ASSESSMENT & PLAN NOTE
Discussed diet and physical activity practices to promote healthy lifestyle.  Discussed mental health wellness.  Discussed dental, eye health.  High risk behaviors:None  Immunizations: up to date  Cancer screening: declined colonoscopy for now  Labs: ordered as below

## 2023-07-24 ENCOUNTER — APPOINTMENT (OUTPATIENT)
Dept: URBAN - METROPOLITAN AREA CLINIC 203 | Age: 48
Setting detail: DERMATOLOGY
End: 2023-07-25

## 2023-07-24 DIAGNOSIS — L44.8 OTHER SPECIFIED PAPULOSQUAMOUS DISORDERS: ICD-10-CM

## 2023-07-24 DIAGNOSIS — L57.8 OTHER SKIN CHANGES DUE TO CHRONIC EXPOSURE TO NONIONIZING RADIATION: ICD-10-CM

## 2023-07-24 DIAGNOSIS — L82.1 OTHER SEBORRHEIC KERATOSIS: ICD-10-CM

## 2023-07-24 DIAGNOSIS — Z12.83 ENCOUNTER FOR SCREENING FOR MALIGNANT NEOPLASM OF SKIN: ICD-10-CM

## 2023-07-24 PROCEDURE — OTHER REASSURANCE: OTHER

## 2023-07-24 PROCEDURE — 99213 OFFICE O/P EST LOW 20 MIN: CPT

## 2023-07-24 PROCEDURE — OTHER SUNSCREEN RECOMMENDATIONS: OTHER

## 2023-07-24 PROCEDURE — OTHER COUNSELING: OTHER

## 2023-07-24 ASSESSMENT — LOCATION DETAILED DESCRIPTION DERM
LOCATION DETAILED: RIGHT DISTAL DORSAL FOREARM
LOCATION DETAILED: RIGHT POPLITEAL SKIN
LOCATION DETAILED: LEFT MEDIAL INFERIOR CHEST
LOCATION DETAILED: RIGHT SUPERIOR UPPER BACK

## 2023-07-24 ASSESSMENT — LOCATION SIMPLE DESCRIPTION DERM
LOCATION SIMPLE: CHEST
LOCATION SIMPLE: RIGHT POPLITEAL SKIN
LOCATION SIMPLE: RIGHT UPPER BACK
LOCATION SIMPLE: RIGHT FOREARM

## 2023-07-24 ASSESSMENT — PAIN INTENSITY VAS: HOW INTENSE IS YOUR PAIN 0 BEING NO PAIN, 10 BEING THE MOST SEVERE PAIN POSSIBLE?: NO PAIN

## 2023-07-24 ASSESSMENT — LOCATION ZONE DERM
LOCATION ZONE: ARM
LOCATION ZONE: TRUNK
LOCATION ZONE: LEG

## 2023-07-24 NOTE — PROCEDURE: COUNSELING
Detail Level: Generalized
Detail Level: Detailed
Patient Specific Counseling (Will Not Stick From Patient To Patient): Patient can come back for appointment if he would like treated

## 2023-08-11 ENCOUNTER — OFFICE VISIT (OUTPATIENT)
Dept: PRIMARY CARE | Facility: CLINIC | Age: 48
End: 2023-08-11
Payer: COMMERCIAL

## 2023-08-11 VITALS
BODY MASS INDEX: 25 KG/M2 | OXYGEN SATURATION: 99 % | DIASTOLIC BLOOD PRESSURE: 84 MMHG | SYSTOLIC BLOOD PRESSURE: 118 MMHG | HEART RATE: 85 BPM | TEMPERATURE: 97.7 F | WEIGHT: 194.8 LBS | HEIGHT: 74 IN | RESPIRATION RATE: 18 BRPM

## 2023-08-11 DIAGNOSIS — Z12.5 PROSTATE CANCER SCREENING: ICD-10-CM

## 2023-08-11 DIAGNOSIS — R73.01 IMPAIRED FASTING GLUCOSE: ICD-10-CM

## 2023-08-11 DIAGNOSIS — Z12.11 COLON CANCER SCREENING: ICD-10-CM

## 2023-08-11 DIAGNOSIS — Z00.00 ROUTINE PHYSICAL EXAMINATION: Primary | ICD-10-CM

## 2023-08-11 DIAGNOSIS — E78.00 PURE HYPERCHOLESTEROLEMIA: ICD-10-CM

## 2023-08-11 PROCEDURE — 99396 PREV VISIT EST AGE 40-64: CPT | Performed by: NURSE PRACTITIONER

## 2023-08-11 PROCEDURE — 3008F BODY MASS INDEX DOCD: CPT | Performed by: NURSE PRACTITIONER

## 2023-08-11 ASSESSMENT — PATIENT HEALTH QUESTIONNAIRE - PHQ9: SUM OF ALL RESPONSES TO PHQ9 QUESTIONS 1 & 2: 0

## 2023-08-11 ASSESSMENT — ENCOUNTER SYMPTOMS
CARDIOVASCULAR NEGATIVE: 1
PSYCHIATRIC NEGATIVE: 1
HEMATOLOGIC/LYMPHATIC NEGATIVE: 1
ENDOCRINE NEGATIVE: 1
EYES NEGATIVE: 1
RESPIRATORY NEGATIVE: 1
CONSTITUTIONAL NEGATIVE: 1
GASTROINTESTINAL NEGATIVE: 1
MUSCULOSKELETAL NEGATIVE: 1
NEUROLOGICAL NEGATIVE: 1

## 2023-08-11 ASSESSMENT — PAIN SCALES - GENERAL: PAINLEVEL: 0-NO PAIN

## 2023-08-11 NOTE — PROGRESS NOTES
[facility] at [address]  [facility] [address]  [phone]  Fax [phone]      Patient ID: Darren Mercado                              : 1975    Visit Date: 2023    Chief Complaint: Annual Exam    Patient Active Problem List   Diagnosis   • Pure hypercholesterolemia   • Impaired fasting glucose   • Seasonal allergies   • Routine physical examination   • Dysfunction of left eustachian tube   • Colon cancer screening       No current outpatient medications on file.     No current facility-administered medications for this visit.       HPI  Routine PE      No Known Allergies    Past Surgical History:   Procedure Laterality Date   • EYE SURGERY Right     lazy eye       No past medical history on file.    Health Maintenance   Topic Date Due   • Colorectal Cancer Screening  Never done   • Influenza Vaccine (1) 2023   • Depression Screening  2024   • Zoster Vaccine (1 of 2) 2026   • DTaP, Tdap, and Td Vaccines (2 - Td or Tdap) 2028   • COVID-19 Vaccine  Completed   • Meningococcal ACWY  Aged Out   • HIB Vaccines  Aged Out   • IPV Vaccines  Aged Out   • HPV Vaccines  Aged Out   • Pneumococcal  Aged Out   • Hepatitis B Vaccines  Discontinued   • HIV Screening  Discontinued   • Hepatitis C Screening  Discontinued       Social History     Socioeconomic History   • Marital status:      Spouse name: None   • Number of children: None   • Years of education: None   • Highest education level: None   Occupational History   • Occupation:      Comment: [employer]   Tobacco Use   • Smoking status: Never   • Smokeless tobacco: Never   Substance and Sexual Activity   • Alcohol use: Yes     Alcohol/week: 5.0 standard drinks of alcohol     Types: 5 Standard drinks or equivalent per week   • Drug use: Never   • Sexual activity: Yes     Partners: Female       Family History   Adopted: Yes   Family  "history unknown: Yes       Review Of Systems  Review of Systems   Constitutional: Negative.    HENT: Negative.    Eyes: Negative.    Respiratory: Negative.    Cardiovascular: Negative.    Gastrointestinal: Negative.    Endocrine: Negative.    Genitourinary: Negative.    Musculoskeletal: Negative.    Skin: Negative.    Allergic/Immunologic: Positive for environmental allergies. Negative for food allergies and immunocompromised state.   Neurological: Negative.    Hematological: Negative.    Psychiatric/Behavioral: Negative.         Vitals:    08/11/23 0732   BP: 118/84   BP Location: Left upper arm   Patient Position: Sitting   Pulse: 85   Resp: 18   Temp: 36.5 °C (97.7 °F)   TempSrc: Temporal   SpO2: 99%   Weight: 88.4 kg (194 lb 12.8 oz)   Height: 1.88 m (6' 2\")       Body mass index is 25.01 kg/m².      Physical Exam  Vitals reviewed.   Constitutional:       General: He is not in acute distress.     Appearance: Normal appearance. He is not ill-appearing, toxic-appearing or diaphoretic.   HENT:      Head: Normocephalic.      Right Ear: Tympanic membrane, ear canal and external ear normal.      Left Ear: Tympanic membrane, ear canal and external ear normal.      Nose: Nose normal.      Mouth/Throat:      Mouth: Mucous membranes are moist.      Pharynx: Oropharynx is clear. No oropharyngeal exudate or posterior oropharyngeal erythema.   Eyes:      General:         Left eye: No discharge.      Extraocular Movements: Extraocular movements intact.      Conjunctiva/sclera: Conjunctivae normal.      Pupils: Pupils are equal, round, and reactive to light.   Neck:      Vascular: No carotid bruit.   Cardiovascular:      Rate and Rhythm: Normal rate and regular rhythm.      Heart sounds: No murmur heard.     No friction rub. No gallop.   Pulmonary:      Effort: Pulmonary effort is normal.      Breath sounds: Normal breath sounds. No wheezing, rhonchi or rales.   Abdominal:      General: Bowel sounds are normal. There is no " distension.      Palpations: Abdomen is soft. There is no mass.      Tenderness: There is no abdominal tenderness. There is no right CVA tenderness or left CVA tenderness.   Musculoskeletal:      Cervical back: Neck supple. No rigidity or tenderness.      Right lower leg: No edema.      Left lower leg: No edema.   Lymphadenopathy:      Cervical: No cervical adenopathy.   Skin:     General: Skin is warm and dry.      Coloration: Skin is not pale.      Findings: No erythema or rash.   Neurological:      General: No focal deficit present.      Mental Status: He is alert and oriented to person, place, and time.      Gait: Gait normal.      Deep Tendon Reflexes: Reflexes normal.   Psychiatric:         Mood and Affect: Mood and affect normal.         Speech: Speech normal.         Behavior: Behavior normal.         Assessment/Plan     Problem List Items Addressed This Visit     Pure hypercholesterolemia    Current Assessment & Plan     Labs ordered  CT calcium score ordered.         Relevant Orders    CT HEART CORONARY ARTERY CALCIUM SCORE WITHOUT IV CONTRAST    Impaired fasting glucose    Current Assessment & Plan     Labs ordered plus A1C.         Relevant Orders    Hemoglobin A1c    Routine physical examination - Primary    Current Assessment & Plan     Screening labs ordered.         Relevant Orders    Comprehensive metabolic panel    Lipid panel    CBC and Differential    Urinalysis with Reflex Culture (ED and Outpatient only)    Hemoglobin A1c    Colon cancer screening    Current Assessment & Plan     Prefers FIT kit for now--ordered.         Relevant Orders    FIT Test   Other Visit Diagnoses     Prostate cancer screening        Relevant Orders    PSA                JEANNETTE Ayala  8/11/2023

## 2023-08-19 LAB
ALBUMIN SERPL-MCNC: 4.7 G/DL (ref 3.6–5.1)
ALBUMIN/GLOB SERPL: 2.1 (CALC) (ref 1–2.5)
ALP SERPL-CCNC: 43 U/L (ref 36–130)
ALT SERPL-CCNC: 17 U/L (ref 9–46)
APPEARANCE UR: CLEAR
AST SERPL-CCNC: 16 U/L (ref 10–40)
BACTERIA #/AREA URNS HPF: NORMAL /HPF
BACTERIA UR CULT: NORMAL
BASOPHILS # BLD AUTO: 41 CELLS/UL (ref 0–200)
BASOPHILS NFR BLD AUTO: 1 %
BILIRUB SERPL-MCNC: 0.8 MG/DL (ref 0.2–1.2)
BILIRUB UR QL STRIP: NEGATIVE
BUN SERPL-MCNC: 14 MG/DL (ref 7–25)
BUN/CREAT SERPL: ABNORMAL (CALC) (ref 6–22)
CALCIUM SERPL-MCNC: 9.8 MG/DL (ref 8.6–10.3)
CHLORIDE SERPL-SCNC: 101 MMOL/L (ref 98–110)
CHOLEST SERPL-MCNC: 336 MG/DL
CHOLEST/HDLC SERPL: 6.6 (CALC)
CO2 SERPL-SCNC: 30 MMOL/L (ref 20–32)
COLOR UR: YELLOW
CREAT SERPL-MCNC: 0.9 MG/DL (ref 0.6–1.29)
EGFRCR SERPLBLD CKD-EPI 2021: 106 ML/MIN/1.73M2
EOSINOPHIL # BLD AUTO: 221 CELLS/UL (ref 15–500)
EOSINOPHIL NFR BLD AUTO: 5.4 %
ERYTHROCYTE [DISTWIDTH] IN BLOOD BY AUTOMATED COUNT: 12.9 % (ref 11–15)
GLOBULIN SER CALC-MCNC: 2.2 G/DL (CALC) (ref 1.9–3.7)
GLUCOSE SERPL-MCNC: 101 MG/DL (ref 65–99)
GLUCOSE UR QL STRIP: NEGATIVE
HBA1C MFR BLD: 5.5 % OF TOTAL HGB
HCT VFR BLD AUTO: 45.2 % (ref 38.5–50)
HDLC SERPL-MCNC: 51 MG/DL
HEMOCCULT STL QL IA: NORMAL
HGB BLD-MCNC: 15.2 G/DL (ref 13.2–17.1)
HGB UR QL STRIP: NEGATIVE
HYALINE CASTS #/AREA URNS LPF: NORMAL /LPF
KETONES UR QL STRIP: NEGATIVE
LDLC SERPL CALC-MCNC: 254 MG/DL (CALC)
LEUKOCYTE ESTERASE UR QL STRIP: NEGATIVE
LYMPHOCYTES # BLD AUTO: 1546 CELLS/UL (ref 850–3900)
LYMPHOCYTES NFR BLD AUTO: 37.7 %
MCH RBC QN AUTO: 28.9 PG (ref 27–33)
MCHC RBC AUTO-ENTMCNC: 33.6 G/DL (ref 32–36)
MCV RBC AUTO: 85.9 FL (ref 80–100)
MONOCYTES # BLD AUTO: 324 CELLS/UL (ref 200–950)
MONOCYTES NFR BLD AUTO: 7.9 %
NEUTROPHILS # BLD AUTO: 1968 CELLS/UL (ref 1500–7800)
NEUTROPHILS NFR BLD AUTO: 48 %
NITRITE UR QL STRIP: NEGATIVE
NONHDLC SERPL-MCNC: 285 MG/DL (CALC)
PH UR STRIP: 7.5 [PH] (ref 5–8)
PLATELET # BLD AUTO: 258 THOUSAND/UL (ref 140–400)
PMV BLD REES-ECKER: 9.6 FL (ref 7.5–12.5)
POTASSIUM SERPL-SCNC: 4.3 MMOL/L (ref 3.5–5.3)
PROT SERPL-MCNC: 6.9 G/DL (ref 6.1–8.1)
PROT UR QL STRIP: NEGATIVE
PSA SERPL-MCNC: 0.44 NG/ML
RBC # BLD AUTO: 5.26 MILLION/UL (ref 4.2–5.8)
RBC #/AREA URNS HPF: NORMAL /HPF
SERVICE CMNT-IMP: NORMAL
SODIUM SERPL-SCNC: 139 MMOL/L (ref 135–146)
SP GR UR STRIP: 1.01 (ref 1–1.03)
SQUAMOUS #/AREA URNS HPF: NORMAL /HPF
TRIGL SERPL-MCNC: 150 MG/DL
WBC # BLD AUTO: 4.1 THOUSAND/UL (ref 3.8–10.8)
WBC #/AREA URNS HPF: NORMAL /HPF

## 2023-08-21 ENCOUNTER — HOSPITAL ENCOUNTER (OUTPATIENT)
Dept: RADIOLOGY | Age: 48
Discharge: HOME | End: 2023-08-21
Attending: NURSE PRACTITIONER

## 2023-08-21 DIAGNOSIS — E78.00 PURE HYPERCHOLESTEROLEMIA: ICD-10-CM

## 2023-08-21 PROCEDURE — 75571 CT HRT W/O DYE W/CA TEST: CPT | Mod: MG

## 2023-08-21 PROCEDURE — G1004 CDSM NDSC: HCPCS

## 2023-08-31 ENCOUNTER — OFFICE VISIT (OUTPATIENT)
Dept: CARDIOLOGY | Facility: CLINIC | Age: 48
End: 2023-08-31
Payer: COMMERCIAL

## 2023-08-31 VITALS
BODY MASS INDEX: 24.77 KG/M2 | HEART RATE: 80 BPM | OXYGEN SATURATION: 98 % | WEIGHT: 193 LBS | HEIGHT: 74 IN | DIASTOLIC BLOOD PRESSURE: 80 MMHG | SYSTOLIC BLOOD PRESSURE: 138 MMHG | RESPIRATION RATE: 16 BRPM

## 2023-08-31 DIAGNOSIS — E78.00 PURE HYPERCHOLESTEROLEMIA: Primary | ICD-10-CM

## 2023-08-31 DIAGNOSIS — I25.10 CORONARY ARTERY CALCIFICATION SEEN ON CAT SCAN: ICD-10-CM

## 2023-08-31 LAB
ATRIAL RATE: 75
P AXIS: 55
PR INTERVAL: 176
QRS DURATION: 104
QT INTERVAL: 404
QTC CALCULATION(BAZETT): 451
R AXIS: 41
T WAVE AXIS: 64
VENTRICULAR RATE: 75

## 2023-08-31 PROCEDURE — 93000 ELECTROCARDIOGRAM COMPLETE: CPT | Performed by: INTERNAL MEDICINE

## 2023-08-31 PROCEDURE — 3008F BODY MASS INDEX DOCD: CPT | Performed by: INTERNAL MEDICINE

## 2023-08-31 PROCEDURE — 99204 OFFICE O/P NEW MOD 45 MIN: CPT | Performed by: INTERNAL MEDICINE

## 2023-08-31 RX ORDER — ROSUVASTATIN CALCIUM 20 MG/1
20 TABLET, COATED ORAL DAILY
Qty: 90 TABLET | Refills: 3 | Status: SHIPPED | OUTPATIENT
Start: 2023-08-31 | End: 2024-03-08

## 2023-09-29 ENCOUNTER — DOCUMENTATION (OUTPATIENT)
Dept: PRIMARY CARE | Facility: CLINIC | Age: 48
End: 2023-09-29
Payer: COMMERCIAL

## 2023-09-29 NOTE — PROGRESS NOTES
Aleah Nolasco MA has completed a chart review for Quique Mercado and have determined that the care gap has been satisfied.    [facility]    Care Gap(s) Identified: Colorectal Cancer Screening    Chart Review Completed: Yes          Fit kit test completed on 8/17/23. Negative result.

## 2024-02-24 LAB
CHOLEST SERPL-MCNC: 185 MG/DL
CHOLEST/HDLC SERPL: 3.6 (CALC)
HDLC SERPL-MCNC: 51 MG/DL
LDLC SERPL CALC-MCNC: 112 MG/DL (CALC)
NONHDLC SERPL-MCNC: 134 MG/DL (CALC)
TRIGL SERPL-MCNC: 113 MG/DL

## 2024-03-08 ENCOUNTER — OFFICE VISIT (OUTPATIENT)
Dept: CARDIOLOGY | Facility: CLINIC | Age: 49
End: 2024-03-08
Payer: COMMERCIAL

## 2024-03-08 VITALS
SYSTOLIC BLOOD PRESSURE: 125 MMHG | OXYGEN SATURATION: 98 % | HEIGHT: 74 IN | WEIGHT: 196 LBS | HEART RATE: 75 BPM | BODY MASS INDEX: 25.15 KG/M2 | DIASTOLIC BLOOD PRESSURE: 77 MMHG

## 2024-03-08 DIAGNOSIS — E78.00 PURE HYPERCHOLESTEROLEMIA: ICD-10-CM

## 2024-03-08 DIAGNOSIS — I25.10 CORONARY ARTERY CALCIFICATION SEEN ON CAT SCAN: Primary | ICD-10-CM

## 2024-03-08 PROCEDURE — 3008F BODY MASS INDEX DOCD: CPT | Performed by: INTERNAL MEDICINE

## 2024-03-08 PROCEDURE — 99214 OFFICE O/P EST MOD 30 MIN: CPT | Performed by: INTERNAL MEDICINE

## 2024-03-08 RX ORDER — EZETIMIBE 10 MG/1
10 TABLET ORAL NIGHTLY
Qty: 90 TABLET | Refills: 1 | Status: SHIPPED | OUTPATIENT
Start: 2024-03-08 | End: 2024-09-13 | Stop reason: SDUPTHER

## 2024-03-08 RX ORDER — ROSUVASTATIN CALCIUM 20 MG/1
20 TABLET, COATED ORAL EVERY EVENING
Qty: 90 TABLET | Refills: 3 | Status: SHIPPED | OUTPATIENT
Start: 2024-03-08 | End: 2024-09-13 | Stop reason: SDUPTHER

## 2024-03-08 NOTE — PATIENT INSTRUCTIONS
Continue to exercise daily.  Start taking ezetimibe.  Return to see me after your next lipid profile whenever it is obtained.

## 2024-03-08 NOTE — PROGRESS NOTES
Cardiology  Office Progress Note  [facility] in [facility]  Dept: [phone]      03/08/2024    Reason for visit:   Chief Complaint   Patient presents with   • Follow-up   • Hyperlipidemia       HPI     Darren Mercado is a 48 y.o. male who presents to the office for follow-up of hyperlipidemia. Darren was last seen on 8/11/2023.  We obtained a CT heart for calcium score following that visit and also obtained a lipid profile.  Based on the results of these, as discussed below, we initiated rosuvastatin 20 mg daily on 8/31/2023.    From a cardiac symptom standpoint, he reports that he has no significant problems. He has been taking over the counter magnesium supplements and these seem to have worked well in preventing cramps.    Cardiovascular history and relevant comorbidities:  ? Hyperlipidemia   o Diagnosed around 2001 -around age 26  o Initiated treatment at that time and was on statins for almost 2 decades between 2001 and 2020.  He developed leg cramps and tried different preparations including simvastatin and atorvastatin and he would get relief from his cramps every time he was on a statin holiday.  Co-Q10 did not seem to help much.  Eventually he went off statin completely in 2020.  He lost 15 pounds.  o We restarted rosuvastatin 20 mg daily on 8/31/2023.  · 8/2023: Mild but definite coronary artery disease as evidenced by nonzero calcium score of 43.  To the 75th and 90th percentile for age   · He is a non smoker. No hypertension -blood pressure is usually around 118 systolic.  · Family history:He is adopted and does not know his family history.  His father is alive but he has no contact with him.  He was able to obtain his mother's death certificate.  The cause of death was sepsis but she had ICD infection, heart failure, and renal failure on her contributing diagnoses.  He also has a death certificate if his half brother.  The cause of death was cardiac arrest and the underlying condition  "was head and neck cancer.    Darren  has no past medical history on file.      Darren  has a past surgical history that includes Eye surgery (Right, 1983).     Patient Active Problem List   Diagnosis   • Pure hypercholesterolemia   • Impaired fasting glucose   • Seasonal allergies   • Routine physical examination   • Dysfunction of left eustachian tube   • Colon cancer screening   • Coronary artery calcification seen on CAT scan        Social History     Tobacco Use   • Smoking status: Never   • Smokeless tobacco: Never   Substance Use Topics   • Alcohol use: Yes     Alcohol/week: 5.0 standard drinks of alcohol     Types: 5 Standard drinks or equivalent per week   • Drug use: Never        The patient's family history includes Heart attack in his biological brother; Heart disease in his biological brother and biological mother. He was adopted.     Darren has No Known Allergies.    Current Outpatient Medications   Medication Instructions   • ezetimibe (ZETIA) 10 mg, oral, Nightly   • rosuvastatin (CRESTOR) 20 mg, oral, Every evening        ROS  A comprehensive review of systems was negative except as noted above.     Objective     Vitals:    03/08/24 0854   BP: 125/77   BP Location: Right upper arm   Patient Position: Sitting   Pulse: 75   SpO2: 98%   Weight: 88.9 kg (196 lb)   Height: 1.88 m (6' 2\")       Wt Readings from Last 5 Encounters:   03/08/24 88.9 kg (196 lb)   08/31/23 87.5 kg (193 lb)   08/11/23 88.4 kg (194 lb 12.8 oz)   08/10/22 85.7 kg (189 lb)   08/09/21 80.3 kg (177 lb)       Body mass index is 25.16 kg/m².    Physical Exam  Constitutional:       General: He is not in acute distress.     Appearance: Normal appearance. He is not ill-appearing or toxic-appearing.   HENT:      Head: Normocephalic and atraumatic.      Nose: Nose normal. No rhinorrhea.      Mouth/Throat:      Mouth: Mucous membranes are moist.   Eyes:      General: No scleral icterus.     Extraocular Movements: Extraocular movements " intact.      Conjunctiva/sclera: Conjunctivae normal.   Neck:      Thyroid: No thyromegaly.      Vascular: No carotid bruit or JVD.   Cardiovascular:      Rate and Rhythm: Normal rate and regular rhythm.      Pulses: Normal pulses.      Heart sounds: Normal heart sounds. No murmur heard.     No friction rub. No gallop.   Pulmonary:      Effort: Pulmonary effort is normal. No respiratory distress.      Breath sounds: Normal breath sounds. No stridor. No wheezing or rales.   Chest:      Chest wall: No tenderness.   Abdominal:      General: There is no distension.      Palpations: Abdomen is soft.      Tenderness: There is no abdominal tenderness.   Musculoskeletal:         General: No deformity. Normal range of motion.      Cervical back: Neck supple. No tenderness.      Right lower leg: No edema.      Left lower leg: No edema.   Skin:     General: Skin is warm and dry.      Capillary Refill: Capillary refill takes less than 2 seconds.      Coloration: Skin is not jaundiced.      Findings: No bruising or rash.   Neurological:      General: No focal deficit present.      Mental Status: He is alert and oriented to person, place, and time.      Cranial Nerves: No cranial nerve deficit.      Motor: No weakness.      Gait: Gait normal.   Psychiatric:         Mood and Affect: Mood and affect normal.       LABS:  Hematology  Lab Results   Component Value Date    WBC 4.1 08/18/2023    HGB 15.2 08/18/2023    HCT 45.2 08/18/2023     08/18/2023       Chemistries  Lab Results   Component Value Date     08/18/2023    K 4.3 08/18/2023     08/18/2023    CREATININE 0.90 08/18/2023    BUN 14 08/18/2023    CO2 30 08/18/2023    GLUCOSE 101 (H) 08/18/2023    GLUCOSE NEGATIVE 08/18/2023    CALCIUM 9.8 08/18/2023    ALT 17 08/18/2023    AST 16 08/18/2023       Cholesterol  Lab Results   Component Value Date    CHOL 185 02/23/2024    TRIG 113 02/23/2024    HDL 51 02/23/2024    LDLCALC 112 (H) 02/23/2024     Component       Latest Ref Rng 9/21/2019 8/24/2020 2/22/2021 8/18/2023 2/23/2024   Cholesterol      <200 mg/dL 227 (H)  273 (H)  232 (H)  336 (H)  185    HDL      > OR = 40 mg/dL 49  47  44  51  51    Triglycerides      <150 mg/dL 134  92  92  150 (H)  113    LDL Calculated      mg/dL (calc) 152 (H)  205 (H)  168 (H)  254 (H)  112 (H)    Chol/Hdlc Ratio      <5.0 (calc) 4.6  5.8 (H)  5.3 (H)  6.6 (H)  3.6    Non-HDL, Calculated      <130 mg/dL (calc) 178 (H)  226 (H)  188 (H)  285 (H)  134 (H)       Legend:  (H) High      Endocrine  Lab Results   Component Value Date    HGBA1C 5.5 08/18/2023       IMAGING:  CARDIAC FINDINGS:  CORONARY CALCIUM COMPOSITE AGATSTON SCORE: 43  Left Main:  0  LAD:  43  LCX:  0  RCA:  0     NON-CORONARY FINDINGS:  Aorta:  Visualized portion of thoracic aorta is normal in caliber.  Heart:  Normal cardiac size. No pericardial effusion.  Lungs and Pleura:  Scattered, diminutive nodules measuring less than 3 mm are  seen in right lung on images 15, 26, 34 and 43 and in left lung on image 16 of  axial lung series. Foci of very mild atelectasis and/or scarring are noted in  both lungs.  Elizabeth, Mediastinum and Lymph Nodes:  No suspect mass or lymphadenopathy by  unenhanced CT technique.  Osseous Structures:  No suspicious findings.  Additional Findings:  None.  --  IMPRESSION:  1. Composite Agatston coronary artery calcium score of 43, which is between the  75th and 90th percentiles.  2. Diminutive pulmonary nodules, as above. Current Fleischner Society guidelines  recommend an optional follow-up unenhanced chest CT in 12 months for nodules of  this size in high risk patients, such as those with a history of smoking, and no  routine follow-up imaging in low risk patients      Assessment/Plan   1. Coronary artery calcification seen on CAT scan    2. Pure hypercholesterolemia      He is tolerating his statin. His LDL cholesterol has gone down substantially from 254 down to 112 on rosuvastatin 20 mg daily.  We will  add on ezetimibe to bring it down closer to goal. Would leave it at this dose range, as it would be better to avoid side effects rather than aggressively pursue a goal of <70. I think we will be able to achieve a goal of <100 with the addition of Zetia.  Continue low dose magnesium supplementation.    Follow Up Plans:  Return After your next lipid profile test is done in conjunction with your primary care visit..          Jordan Serrano MD

## 2024-03-29 ENCOUNTER — HOSPITAL ENCOUNTER (EMERGENCY)
Facility: HOSPITAL | Age: 49
End: 2024-03-29
Payer: COMMERCIAL

## 2024-08-13 ENCOUNTER — OFFICE VISIT (OUTPATIENT)
Dept: PRIMARY CARE | Facility: CLINIC | Age: 49
End: 2024-08-13
Payer: COMMERCIAL

## 2024-08-13 VITALS
HEART RATE: 68 BPM | HEIGHT: 74 IN | TEMPERATURE: 97.7 F | BODY MASS INDEX: 25.51 KG/M2 | OXYGEN SATURATION: 99 % | WEIGHT: 198.8 LBS | DIASTOLIC BLOOD PRESSURE: 82 MMHG | SYSTOLIC BLOOD PRESSURE: 122 MMHG | RESPIRATION RATE: 18 BRPM

## 2024-08-13 DIAGNOSIS — Z12.11 COLON CANCER SCREENING: ICD-10-CM

## 2024-08-13 DIAGNOSIS — I25.10 CORONARY ARTERY CALCIFICATION SEEN ON CAT SCAN: ICD-10-CM

## 2024-08-13 DIAGNOSIS — E78.00 PURE HYPERCHOLESTEROLEMIA: ICD-10-CM

## 2024-08-13 DIAGNOSIS — Z00.00 ROUTINE PHYSICAL EXAMINATION: Primary | ICD-10-CM

## 2024-08-13 DIAGNOSIS — Z12.5 PROSTATE CANCER SCREENING: ICD-10-CM

## 2024-08-13 DIAGNOSIS — R73.01 IMPAIRED FASTING GLUCOSE: ICD-10-CM

## 2024-08-13 PROCEDURE — 3008F BODY MASS INDEX DOCD: CPT | Performed by: NURSE PRACTITIONER

## 2024-08-13 PROCEDURE — 99396 PREV VISIT EST AGE 40-64: CPT | Performed by: NURSE PRACTITIONER

## 2024-08-13 ASSESSMENT — ENCOUNTER SYMPTOMS
MUSCULOSKELETAL NEGATIVE: 1
PSYCHIATRIC NEGATIVE: 1
GASTROINTESTINAL NEGATIVE: 1
NEUROLOGICAL NEGATIVE: 1
ENDOCRINE NEGATIVE: 1
RESPIRATORY NEGATIVE: 1
EYES NEGATIVE: 1
HEMATOLOGIC/LYMPHATIC NEGATIVE: 1
CARDIOVASCULAR NEGATIVE: 1
CONSTITUTIONAL NEGATIVE: 1

## 2024-08-13 ASSESSMENT — PAIN SCALES - GENERAL: PAINLEVEL: 0-NO PAIN

## 2024-08-13 ASSESSMENT — PATIENT HEALTH QUESTIONNAIRE - PHQ9: SUM OF ALL RESPONSES TO PHQ9 QUESTIONS 1 & 2: 0

## 2024-08-13 NOTE — PROGRESS NOTES
[facility]  [facility]  [address] [place] [address] [address]  [facility] [address]  [phone]  Fax [phone]      Patient ID: Darren Mercado                              : 1975    Visit Date: 2024    Chief Complaint: Annual Exam    Patient Active Problem List   Diagnosis    Pure hypercholesterolemia    Impaired fasting glucose    Seasonal allergies    Routine physical examination    Dysfunction of left eustachian tube    Colon cancer screening    Coronary artery calcification seen on CAT scan      Current Outpatient Medications   Medication Sig Dispense Refill    ezetimibe (ZETIA) 10 mg tablet Take 1 tablet (10 mg total) by mouth nightly. 90 tablet 1    rosuvastatin (CRESTOR) 20 mg tablet Take 1 tablet (20 mg total) by mouth every evening. 90 tablet 3     No current facility-administered medications for this visit.       HPI  Routine PE        No Known Allergies    Past Surgical History:   Procedure Laterality Date    EYE SURGERY Right     lazy eye       No past medical history on file.    Health Maintenance   Topic Date Due    Influenza Vaccine (1) 2024    Colorectal Cancer Screening  2024    Depression Screening  2025    Zoster Vaccine (1 of 2) 2026    DTaP, Tdap, and Td Vaccines (2 - Td or Tdap) 2028    COVID-19 Vaccine  Completed    Meningococcal ACWY  Aged Out    RSV <20 months  Aged Out    HIB Vaccines  Aged Out    IPV Vaccines  Aged Out    HPV Vaccines  Aged Out    Pneumococcal  Aged Out    Hepatitis B Vaccines  Discontinued    HIV Screening  Discontinued    Hepatitis C Screening  Discontinued       Social History     Socioeconomic History    Marital status:      Spouse name: None    Number of children: None    Years of education: None    Highest education level: None   Occupational History    Occupation:      Comment: [employer]   Tobacco Use    Smoking status: Never    Smokeless tobacco: Never  "  Substance and Sexual Activity    Alcohol use: Yes     Alcohol/week: 5.0 standard drinks of alcohol     Types: 5 Standard drinks or equivalent per week    Drug use: Never    Sexual activity: Yes     Partners: Female       Family History   Adopted: Yes   Problem Relation Age of Onset    Heart disease Biological Mother     Heart attack Biological Brother     Heart disease Biological Brother        Review Of Systems  Review of Systems   Constitutional: Negative.    HENT: Negative.     Eyes: Negative.    Respiratory: Negative.     Cardiovascular: Negative.    Gastrointestinal: Negative.    Endocrine: Negative.    Genitourinary: Negative.    Musculoskeletal: Negative.    Skin: Negative.    Allergic/Immunologic: Positive for environmental allergies.   Neurological: Negative.    Hematological: Negative.    Psychiatric/Behavioral: Negative.          Vitals:    08/13/24 0719   BP: 122/82   BP Location: Left upper arm   Patient Position: Sitting   Pulse: 68   Resp: 18   Temp: 36.5 °C (97.7 °F)   TempSrc: Temporal   SpO2: 99%   Weight: 90.2 kg (198 lb 12.8 oz)   Height: 1.88 m (6' 2.02\")       Body mass index is 25.51 kg/m².    Physical Exam  Vitals reviewed.   Constitutional:       General: He is not in acute distress.     Appearance: Normal appearance. He is not ill-appearing, toxic-appearing or diaphoretic.   HENT:      Head: Normocephalic.      Right Ear: Tympanic membrane, ear canal and external ear normal.      Left Ear: Tympanic membrane, ear canal and external ear normal.      Nose: Nose normal.      Mouth/Throat:      Mouth: Mucous membranes are moist.      Pharynx: Oropharynx is clear. No oropharyngeal exudate or posterior oropharyngeal erythema.   Eyes:      General:         Right eye: No discharge.         Left eye: No discharge.      Extraocular Movements: Extraocular movements intact.      Conjunctiva/sclera: Conjunctivae normal.      Pupils: Pupils are equal, round, and reactive to light.   Neck:      Vascular: " No carotid bruit.   Cardiovascular:      Rate and Rhythm: Normal rate and regular rhythm.      Heart sounds: No murmur heard.     No friction rub. No gallop.   Pulmonary:      Effort: Pulmonary effort is normal.      Breath sounds: Normal breath sounds. No wheezing, rhonchi or rales.   Abdominal:      General: Bowel sounds are normal. There is no distension.      Palpations: Abdomen is soft. There is no mass.      Tenderness: There is no abdominal tenderness. There is no right CVA tenderness or left CVA tenderness.   Musculoskeletal:      Cervical back: Neck supple. No rigidity or tenderness.      Right lower leg: No edema.      Left lower leg: No edema.   Lymphadenopathy:      Cervical: No cervical adenopathy.   Skin:     General: Skin is warm and dry.      Coloration: Skin is not pale.      Findings: No erythema or rash.   Neurological:      General: No focal deficit present.      Mental Status: He is alert and oriented to person, place, and time.      Gait: Gait normal.      Deep Tendon Reflexes: Reflexes normal.   Psychiatric:         Mood and Affect: Mood and affect normal.         Speech: Speech normal.         Behavior: Behavior normal.         Assessment/Plan     Problem List Items Addressed This Visit       Pure hypercholesterolemia    Current Assessment & Plan     Check lipids on statin/Zetia         Impaired fasting glucose    Current Assessment & Plan     Check A1C         Relevant Orders    Hemoglobin A1c    Routine physical examination - Primary    Current Assessment & Plan     Labs ordered  FIT kit ordered  Flu shot in the fall recommended.         Relevant Orders    CBC and Differential    Comprehensive metabolic panel    Lipid panel    Urinalysis with Reflex Culture (ED and Outpatient only)    Hemoglobin A1c    Colon cancer screening    Current Assessment & Plan     Fit kit given.         Relevant Orders    FIT Test    Coronary artery calcification seen on CAT scan    Current Assessment & Plan      On statin/Zetia  Did see cardiology.          Other Visit Diagnoses       Prostate cancer screening        Relevant Orders    PSA                  JEANNETTE Ayala  8/13/2024

## 2024-08-22 LAB
CHOLEST SERPL-MCNC: NORMAL MG/DL
CHOLEST/HDLC SERPL: NORMAL (CALC)
COLOR UR: NORMAL
GLUCOSE SERPL-MCNC: NORMAL MG/DL
HBA1C MFR BLD: NORMAL % OF TOTAL HGB
HDLC SERPL-MCNC: NORMAL MG/DL
HEMOCCULT STL QL IA: NORMAL
LDLC SERPL CALC-MCNC: NORMAL MG/DL (CALC)
NONHDLC SERPL-MCNC: NORMAL MG/DL (CALC)
PSA SERPL-MCNC: NORMAL NG/ML
TRIGL SERPL-MCNC: NORMAL MG/DL
WBC # BLD AUTO: NORMAL THOUSAND/UL

## 2024-08-24 LAB
ALBUMIN SERPL-MCNC: 4.9 G/DL (ref 3.6–5.1)
ALBUMIN/GLOB SERPL: 2.6 (CALC) (ref 1–2.5)
ALP SERPL-CCNC: 39 U/L (ref 36–130)
ALT SERPL-CCNC: 32 U/L (ref 9–46)
APPEARANCE UR: CLEAR
AST SERPL-CCNC: 26 U/L (ref 10–40)
BACTERIA #/AREA URNS HPF: NORMAL /HPF
BACTERIA UR CULT: NORMAL
BASOPHILS # BLD AUTO: 32 CELLS/UL (ref 0–200)
BASOPHILS NFR BLD AUTO: 0.8 %
BILIRUB SERPL-MCNC: 1 MG/DL (ref 0.2–1.2)
BILIRUB UR QL STRIP: NEGATIVE
BUN SERPL-MCNC: 13 MG/DL (ref 7–25)
BUN/CREAT SERPL: ABNORMAL (CALC) (ref 6–22)
CALCIUM SERPL-MCNC: 9.9 MG/DL (ref 8.6–10.3)
CHLORIDE SERPL-SCNC: 101 MMOL/L (ref 98–110)
CHOLEST SERPL-MCNC: 143 MG/DL
CHOLEST/HDLC SERPL: 2.7 (CALC)
CO2 SERPL-SCNC: 30 MMOL/L (ref 20–32)
COLOR UR: YELLOW
CREAT SERPL-MCNC: 0.89 MG/DL (ref 0.6–1.29)
EGFRCR SERPLBLD CKD-EPI 2021: 106 ML/MIN/1.73M2
EOSINOPHIL # BLD AUTO: 220 CELLS/UL (ref 15–500)
EOSINOPHIL NFR BLD AUTO: 5.5 %
ERYTHROCYTE [DISTWIDTH] IN BLOOD BY AUTOMATED COUNT: 12.5 % (ref 11–15)
GLOBULIN SER CALC-MCNC: 1.9 G/DL (CALC) (ref 1.9–3.7)
GLUCOSE SERPL-MCNC: 100 MG/DL (ref 65–99)
GLUCOSE UR QL STRIP: NEGATIVE
HBA1C MFR BLD: 5.9 % OF TOTAL HGB
HCT VFR BLD AUTO: 46.5 % (ref 38.5–50)
HDLC SERPL-MCNC: 53 MG/DL
HGB BLD-MCNC: 15.3 G/DL (ref 13.2–17.1)
HGB UR QL STRIP: NEGATIVE
HYALINE CASTS #/AREA URNS LPF: NORMAL /LPF
KETONES UR QL STRIP: NEGATIVE
LDLC SERPL CALC-MCNC: 74 MG/DL (CALC)
LEUKOCYTE ESTERASE UR QL STRIP: NEGATIVE
LYMPHOCYTES # BLD AUTO: 1412 CELLS/UL (ref 850–3900)
LYMPHOCYTES NFR BLD AUTO: 35.3 %
MCH RBC QN AUTO: 29.3 PG (ref 27–33)
MCHC RBC AUTO-ENTMCNC: 32.9 G/DL (ref 32–36)
MCV RBC AUTO: 88.9 FL (ref 80–100)
MONOCYTES # BLD AUTO: 360 CELLS/UL (ref 200–950)
MONOCYTES NFR BLD AUTO: 9 %
NEUTROPHILS # BLD AUTO: 1976 CELLS/UL (ref 1500–7800)
NEUTROPHILS NFR BLD AUTO: 49.4 %
NITRITE UR QL STRIP: NEGATIVE
NONHDLC SERPL-MCNC: 90 MG/DL (CALC)
PH UR STRIP: 7 [PH] (ref 5–8)
PLATELET # BLD AUTO: 239 THOUSAND/UL (ref 140–400)
PMV BLD REES-ECKER: 9 FL (ref 7.5–12.5)
POTASSIUM SERPL-SCNC: 4.2 MMOL/L (ref 3.5–5.3)
PROT SERPL-MCNC: 6.8 G/DL (ref 6.1–8.1)
PROT UR QL STRIP: NEGATIVE
PSA SERPL-MCNC: 0.47 NG/ML
RBC # BLD AUTO: 5.23 MILLION/UL (ref 4.2–5.8)
RBC #/AREA URNS HPF: NORMAL /HPF
SERVICE CMNT-IMP: NORMAL
SODIUM SERPL-SCNC: 139 MMOL/L (ref 135–146)
SP GR UR STRIP: 1.01 (ref 1–1.03)
SQUAMOUS #/AREA URNS HPF: NORMAL /HPF
TRIGL SERPL-MCNC: 80 MG/DL
WBC # BLD AUTO: 4 THOUSAND/UL (ref 3.8–10.8)
WBC #/AREA URNS HPF: NORMAL /HPF

## 2024-09-13 ENCOUNTER — OFFICE VISIT (OUTPATIENT)
Dept: CARDIOLOGY | Facility: CLINIC | Age: 49
End: 2024-09-13
Payer: COMMERCIAL

## 2024-09-13 ENCOUNTER — TELEPHONE (OUTPATIENT)
Dept: SCHEDULING | Facility: CLINIC | Age: 49
End: 2024-09-13
Payer: COMMERCIAL

## 2024-09-13 VITALS
OXYGEN SATURATION: 98 % | HEART RATE: 68 BPM | BODY MASS INDEX: 25.28 KG/M2 | WEIGHT: 197 LBS | DIASTOLIC BLOOD PRESSURE: 83 MMHG | SYSTOLIC BLOOD PRESSURE: 128 MMHG | HEIGHT: 74 IN

## 2024-09-13 DIAGNOSIS — I25.10 CORONARY ARTERY CALCIFICATION SEEN ON CAT SCAN: ICD-10-CM

## 2024-09-13 DIAGNOSIS — E78.00 PURE HYPERCHOLESTEROLEMIA: Primary | ICD-10-CM

## 2024-09-13 LAB
ATRIAL RATE: 56
P AXIS: 38
PR INTERVAL: 170
QRS DURATION: 102
QT INTERVAL: 442
QTC CALCULATION(BAZETT): 426
R AXIS: 34
T WAVE AXIS: 63
VENTRICULAR RATE: 56

## 2024-09-13 PROCEDURE — 99214 OFFICE O/P EST MOD 30 MIN: CPT | Performed by: INTERNAL MEDICINE

## 2024-09-13 PROCEDURE — 3008F BODY MASS INDEX DOCD: CPT | Performed by: INTERNAL MEDICINE

## 2024-09-13 PROCEDURE — 93000 ELECTROCARDIOGRAM COMPLETE: CPT | Performed by: INTERNAL MEDICINE

## 2024-09-13 RX ORDER — ROSUVASTATIN CALCIUM 20 MG/1
20 TABLET, COATED ORAL EVERY EVENING
Qty: 90 TABLET | Refills: 3 | Status: SHIPPED | OUTPATIENT
Start: 2024-09-13 | End: 2024-09-13 | Stop reason: SDUPTHER

## 2024-09-13 RX ORDER — ROSUVASTATIN CALCIUM 20 MG/1
20 TABLET, COATED ORAL EVERY EVENING
Qty: 90 TABLET | Refills: 3 | Status: SHIPPED | OUTPATIENT
Start: 2024-09-13

## 2024-09-13 RX ORDER — EZETIMIBE 10 MG/1
10 TABLET ORAL NIGHTLY
Qty: 90 TABLET | Refills: 3 | Status: SHIPPED | OUTPATIENT
Start: 2024-09-13

## 2024-09-13 NOTE — TELEPHONE ENCOUNTER
"Medicine Refill Request [facility]    Name of Patient: Darren Tolliverer's name/Relationship: Hailey Washington number: [phone] opt 2     Medication Name, Dosage, Supply: ezetimibe (ZETIA) 10 mg tablet     Quantity left: 0    Pharmacy: [facility]     Last Office Visit: 9/13  Last Consult Visit: Visit date not found  Last Telemedicine Visit: Visit date not found    Next Appointment: Visit date not found      Current Outpatient Medications:     ezetimibe (ZETIA) 10 mg tablet, Take 1 tablet (10 mg total) by mouth nightly., Disp: 90 tablet, Rfl: 3    rosuvastatin (CRESTOR) 20 mg tablet, Take 1 tablet (20 mg total) by mouth every evening., Disp: 90 tablet, Rfl: 3      BP Readings from Last 3 Encounters:   09/13/24 128/83   08/13/24 122/82   03/08/24 125/77       Recent Lab results:  Lab Results   Component Value Date    CHOL 143 08/23/2024   ,   Lab Results   Component Value Date    HDL 53 08/23/2024   ,   Lab Results   Component Value Date    LDLCALC 74 08/23/2024   ,   Lab Results   Component Value Date    TRIG 80 08/23/2024        Lab Results   Component Value Date    GLUCOSE 100 (H) 08/23/2024    GLUCOSE NEGATIVE 08/23/2024   ,   Lab Results   Component Value Date    HGBA1C 5.9 (H) 08/23/2024         Lab Results   Component Value Date    CREATININE 0.89 08/23/2024       No results found for: \"TSH\"        "

## 2024-09-13 NOTE — PROGRESS NOTES
Cardiology  Office Progress Note  [facility] in [facility]  Dept: [phone]      09/13/2024    Reason for visit:   Chief Complaint   Patient presents with    coronary artery calcification       HPI     Darren Mercado is a 49 y.o. male who presents to the office for follow-up of hyperlipidemia. Darren was last seen on 3/8/2024.  He has no complaints to bring forth today.    The patient denies chest pain, shortness of breath, edema, paroxysmal nocturnal dyspnea, orthopnea, palpitations, dizziness, syncope, intermittent claudication or stroke symptoms.     Cardiovascular history and relevant comorbidities:  Hyperlipidemia   Diagnosed around 2001 -around age 26  Initiated treatment at that time and was on statins for almost 2 decades between 2001 and 2020.  He developed leg cramps and tried different preparations including simvastatin and atorvastatin and he would get relief from his cramps every time he was on a statin holiday.  Co-Q10 did not seem to help much.  Eventually he went off statin completely in 2020.  He lost 15 pounds.  We restarted rosuvastatin 20 mg daily on 8/31/2023.  8/2023: Mild but definite coronary artery disease as evidenced by nonzero calcium score of 43.  To the 75th and 90th percentile for age   He is a non smoker. No hypertension -blood pressure is usually around 118 systolic.  Family history:He is adopted and does not know his family history.  His father is alive but he has no contact with him.  He was able to obtain his mother's death certificate.  The cause of death was sepsis but she had ICD infection, heart failure, and renal failure on her contributing diagnoses.  He also has a death certificate if his half brother.  The cause of death was cardiac arrest and the underlying condition was head and neck cancer.      Darren  has no past medical history on file.      Darren  has a past surgical history that includes Eye surgery (Right, 1983).     Patient Active Problem List  "  Diagnosis    Pure hypercholesterolemia    Impaired fasting glucose    Seasonal allergies    Routine physical examination    Dysfunction of left eustachian tube    Colon cancer screening    Coronary artery calcification seen on CAT scan        Social History     Tobacco Use    Smoking status: Never    Smokeless tobacco: Never   Substance Use Topics    Alcohol use: Yes     Alcohol/week: 5.0 standard drinks of alcohol     Types: 5 Standard drinks or equivalent per week    Drug use: Never        The patient's family history includes Heart attack in his biological brother; Heart disease in his biological brother and biological mother. He was adopted.     Darren has No Known Allergies.    Current Outpatient Medications   Medication Instructions    ezetimibe (ZETIA) 10 mg, oral, Nightly    rosuvastatin (CRESTOR) 20 mg, oral, Every evening        ROS  A comprehensive review of systems was negative except as noted above.     Objective     Vitals:    09/13/24 0851   BP: 128/83   BP Location: Right upper arm   Patient Position: Sitting   Pulse: 68   SpO2: 98%   Weight: 89.4 kg (197 lb)   Height: 1.88 m (6' 2\")       Wt Readings from Last 5 Encounters:   09/13/24 89.4 kg (197 lb)   08/13/24 90.2 kg (198 lb 12.8 oz)   03/08/24 88.9 kg (196 lb)   08/31/23 87.5 kg (193 lb)   08/11/23 88.4 kg (194 lb 12.8 oz)       Body mass index is 25.29 kg/m².    Physical Exam  Constitutional:       General: He is not in acute distress.     Appearance: Normal appearance. He is not ill-appearing or toxic-appearing.   HENT:      Head: Normocephalic and atraumatic.      Nose: Nose normal. No rhinorrhea.      Mouth/Throat:      Mouth: Mucous membranes are moist.   Eyes:      General: No scleral icterus.     Extraocular Movements: Extraocular movements intact.      Conjunctiva/sclera: Conjunctivae normal.   Neck:      Thyroid: No thyromegaly.      Vascular: No carotid bruit or JVD.   Cardiovascular:      Rate and Rhythm: Normal rate and regular " rhythm.      Pulses: Normal pulses.      Heart sounds: Normal heart sounds. No murmur heard.     No friction rub. No gallop.   Pulmonary:      Effort: Pulmonary effort is normal. No respiratory distress.      Breath sounds: Normal breath sounds. No stridor. No wheezing or rales.   Chest:      Chest wall: No tenderness.   Abdominal:      General: There is no distension.      Palpations: Abdomen is soft.      Tenderness: There is no abdominal tenderness.   Musculoskeletal:         General: No deformity. Normal range of motion.      Cervical back: Neck supple. No tenderness.      Right lower leg: No edema.      Left lower leg: No edema.   Skin:     General: Skin is warm and dry.      Capillary Refill: Capillary refill takes less than 2 seconds.      Coloration: Skin is not jaundiced.      Findings: No bruising or rash.   Neurological:      General: No focal deficit present.      Mental Status: He is alert and oriented to person, place, and time.      Cranial Nerves: No cranial nerve deficit.      Motor: No weakness.      Gait: Gait normal.   Psychiatric:         Mood and Affect: Mood and affect normal.       LABS:  Hematology  Lab Results   Component Value Date    WBC 4.0 08/23/2024    HGB 15.3 08/23/2024    HCT 46.5 08/23/2024     08/23/2024       Chemistries  Lab Results   Component Value Date     08/23/2024    K 4.2 08/23/2024     08/23/2024    CREATININE 0.89 08/23/2024    BUN 13 08/23/2024    CO2 30 08/23/2024    GLUCOSE 100 (H) 08/23/2024    GLUCOSE NEGATIVE 08/23/2024    CALCIUM 9.9 08/23/2024    ALT 32 08/23/2024    AST 26 08/23/2024       Cholesterol  Lab Results   Component Value Date    CHOL 143 08/23/2024    TRIG 80 08/23/2024    HDL 53 08/23/2024    LDLCALC 74 08/23/2024     Component      Latest Ref Rng 8/18/2023 2/23/2024 8/23/2024   Cholesterol      <200 mg/dL 336 (H)  185  143    HDL      > OR = 40 mg/dL 51  51  53    Triglycerides      <150 mg/dL 150 (H)  113  80    LDL Calculated       mg/dL (calc) 254 (H)  112 (H)  74    Chol/Hdlc Ratio      <5.0 (calc) 6.6 (H)  3.6  2.7    Non-HDL, Calculated      <130 mg/dL (calc) 285 (H)  134 (H)  90      Endocrine  Lab Results   Component Value Date    HGBA1C 5.9 (H) 08/23/2024       IMAGING:    ECG 9/13/2024:  Sinus bradycardia. Otherwise normal ECG.      Assessment/Plan   1. Pure hypercholesterolemia    2. Coronary artery calcification seen on CAT scan      He is doing well from a cardiac standpoint. No symptoms. His lipid profile has responded excellently to his current regimen. Recommend daily exercise in addition to dietary measures. Discussed resistance exercise and muscle building. He takes CoQ10 and Magnesium supplements. I encouraged drinking plenty of water.    He will return for follow up in one to three years as he feels necessary, unless he develops symptoms sooner. He will continue to follow-up with JEANNETTE Ayala.         Jordan Serrano MD

## 2025-08-13 SDOH — ECONOMIC STABILITY: INCOME INSECURITY: IN THE LAST 12 MONTHS, WAS THERE A TIME WHEN YOU WERE NOT ABLE TO PAY THE MORTGAGE OR RENT ON TIME?: NO

## 2025-08-13 SDOH — ECONOMIC STABILITY: FOOD INSECURITY: WITHIN THE PAST 12 MONTHS, YOU WORRIED THAT YOUR FOOD WOULD RUN OUT BEFORE YOU GOT MONEY TO BUY MORE.: NEVER TRUE

## 2025-08-13 SDOH — ECONOMIC STABILITY: FOOD INSECURITY: WITHIN THE PAST 12 MONTHS, THE FOOD YOU BOUGHT JUST DIDN'T LAST AND YOU DIDN'T HAVE MONEY TO GET MORE.: NEVER TRUE

## 2025-08-13 SDOH — ECONOMIC STABILITY: TRANSPORTATION INSECURITY
IN THE PAST 12 MONTHS, HAS LACK OF TRANSPORTATION KEPT YOU FROM MEETINGS, WORK, OR FROM GETTING THINGS NEEDED FOR DAILY LIVING?: NO

## 2025-08-13 SDOH — ECONOMIC STABILITY: TRANSPORTATION INSECURITY
IN THE PAST 12 MONTHS, HAS THE LACK OF TRANSPORTATION KEPT YOU FROM MEDICAL APPOINTMENTS OR FROM GETTING MEDICATIONS?: NO

## 2025-08-13 ASSESSMENT — SOCIAL DETERMINANTS OF HEALTH (SDOH): IN THE PAST 12 MONTHS, HAS THE ELECTRIC, GAS, OIL, OR WATER COMPANY THREATENED TO SHUT OFF SERVICE IN YOUR HOME?: NO

## 2025-08-14 ENCOUNTER — OFFICE VISIT (OUTPATIENT)
Dept: PRIMARY CARE | Facility: CLINIC | Age: 50
End: 2025-08-14
Payer: COMMERCIAL

## 2025-08-14 VITALS
DIASTOLIC BLOOD PRESSURE: 70 MMHG | BODY MASS INDEX: 25.55 KG/M2 | SYSTOLIC BLOOD PRESSURE: 128 MMHG | WEIGHT: 192.8 LBS | OXYGEN SATURATION: 97 % | TEMPERATURE: 98.1 F | HEIGHT: 73 IN | HEART RATE: 67 BPM | RESPIRATION RATE: 16 BRPM

## 2025-08-14 DIAGNOSIS — E78.00 PURE HYPERCHOLESTEROLEMIA: ICD-10-CM

## 2025-08-14 DIAGNOSIS — R73.01 IMPAIRED FASTING GLUCOSE: ICD-10-CM

## 2025-08-14 DIAGNOSIS — I25.10 CORONARY ARTERY CALCIFICATION SEEN ON CAT SCAN: ICD-10-CM

## 2025-08-14 DIAGNOSIS — Z12.5 PROSTATE CANCER SCREENING: ICD-10-CM

## 2025-08-14 DIAGNOSIS — Z00.00 ROUTINE PHYSICAL EXAMINATION: Primary | ICD-10-CM

## 2025-08-14 DIAGNOSIS — Z12.11 COLON CANCER SCREENING: ICD-10-CM

## 2025-08-14 DIAGNOSIS — Z30.09 VASECTOMY EVALUATION: ICD-10-CM

## 2025-08-14 PROBLEM — H69.92 DYSFUNCTION OF LEFT EUSTACHIAN TUBE: Status: RESOLVED | Noted: 2021-08-12 | Resolved: 2025-08-14

## 2025-08-14 PROCEDURE — 99396 PREV VISIT EST AGE 40-64: CPT | Performed by: NURSE PRACTITIONER

## 2025-08-14 PROCEDURE — 3008F BODY MASS INDEX DOCD: CPT | Performed by: NURSE PRACTITIONER

## 2025-08-14 RX ORDER — EZETIMIBE 10 MG/1
10 TABLET ORAL NIGHTLY
Qty: 90 TABLET | Refills: 3 | Status: SHIPPED | OUTPATIENT
Start: 2025-08-14

## 2025-08-14 ASSESSMENT — ENCOUNTER SYMPTOMS
GASTROINTESTINAL NEGATIVE: 1
PSYCHIATRIC NEGATIVE: 1
NEUROLOGICAL NEGATIVE: 1
CONSTITUTIONAL NEGATIVE: 1
RESPIRATORY NEGATIVE: 1
ENDOCRINE NEGATIVE: 1
MUSCULOSKELETAL NEGATIVE: 1
EYES NEGATIVE: 1
HEMATOLOGIC/LYMPHATIC NEGATIVE: 1
CARDIOVASCULAR NEGATIVE: 1

## 2025-08-14 ASSESSMENT — PATIENT HEALTH QUESTIONNAIRE - PHQ9: SUM OF ALL RESPONSES TO PHQ9 QUESTIONS 1 & 2: 0

## 2025-09-05 DIAGNOSIS — E78.00 PURE HYPERCHOLESTEROLEMIA: ICD-10-CM

## 2025-09-05 RX ORDER — EZETIMIBE 10 MG/1
10 TABLET ORAL SEE ADMIN INSTRUCTIONS
Qty: 90 TABLET | Refills: 3 | OUTPATIENT
Start: 2025-09-05